# Patient Record
Sex: MALE | Race: WHITE | NOT HISPANIC OR LATINO | Employment: OTHER | URBAN - NONMETROPOLITAN AREA
[De-identification: names, ages, dates, MRNs, and addresses within clinical notes are randomized per-mention and may not be internally consistent; named-entity substitution may affect disease eponyms.]

---

## 2018-07-25 ENCOUNTER — OFFICE VISIT (OUTPATIENT)
Dept: UROLOGY | Facility: CLINIC | Age: 68
End: 2018-07-25

## 2018-07-25 VITALS
SYSTOLIC BLOOD PRESSURE: 120 MMHG | BODY MASS INDEX: 26.18 KG/M2 | WEIGHT: 215 LBS | TEMPERATURE: 98.1 F | HEART RATE: 88 BPM | OXYGEN SATURATION: 92 % | DIASTOLIC BLOOD PRESSURE: 70 MMHG | HEIGHT: 76 IN

## 2018-07-25 DIAGNOSIS — N40.2 PROSTATE NODULE: Primary | ICD-10-CM

## 2018-07-25 PROCEDURE — 99203 OFFICE O/P NEW LOW 30 MIN: CPT | Performed by: UROLOGY

## 2018-07-25 NOTE — PROGRESS NOTES
Chief Complaint  Prostate nodule        KAREY Ordaz is a 68 y.o. male who is referred for evaluation of his prostate after his primary care provider noted an abnormality on his digital rectal exam.  His been seen before and PSA has always been normal over the past 4 years.  He denies any difficulty voiding describing an AUA obstructive index of 0.  He does have a history of kidney stones but no family history of prostate cancer.  He has recently had wide excision of a melanoma on his left shoulder.    Vitals:    07/25/18 1331   BP: 120/70   Pulse: 88   Temp: 98.1 °F (36.7 °C)   SpO2: 92%       Past Medical History  Past Medical History:   Diagnosis Date   • Hypertension    • Kidney stones    • Prostate nodule        Past Surgical History  Past Surgical History:   Procedure Laterality Date   • KNEE SURGERY     • TOTAL HIP ARTHROPLASTY         Medications  has a current medication list which includes the following prescription(s): lisinopril.      Allergies  No Known Allergies    Social History  Social History     Social History Narrative   • No narrative on file       Family History  He has no family history of bladder or kidney cancer  He has no family history of kidney stones      AUA Symptom Score:      Review of Systems  Review of Systems   Constitutional: Negative.    Genitourinary: Negative.    Skin: Positive for wound.   All other systems reviewed and are negative.      Physical Exam  Physical Exam   Constitutional: He is oriented to person, place, and time. He appears well-developed and well-nourished.   HENT:   Head: Normocephalic and atraumatic.   Neck: Normal range of motion.   Pulmonary/Chest: Effort normal. No respiratory distress.   Abdominal: Soft. He exhibits no distension and no mass. There is no tenderness. No hernia.   Genitourinary: Testes normal and penis normal.         Musculoskeletal: Normal range of motion.   Lymphadenopathy:     He has no cervical adenopathy.   Neurological: He is alert and  oriented to person, place, and time.   Skin: Skin is warm and dry.   Psychiatric: He has a normal mood and affect. His behavior is normal.   Vitals reviewed.      Labs Recent and today in the office:  Results for orders placed or performed during the hospital encounter of 08/31/15   CBC and Differential   Result Value Ref Range    WBC 11.0 (H) 4.8 - 10.8 THOUS    RBC 4.83 4.70 - 6.10 m/uL    Hemoglobin 15.2 14.0 - 18.0 g/dL    Hematocrit 44 42 - 52 %    MCV 90.9 80.0 - 94.0 fL    MCH 31.5 (H) 27.0 - 31.0 uug    MCHC 34.6 30.0 - 37.0 g/dL    RDW 12.1 11.5 - 14.5 %    Platelets 206 130 - 400 THOUS    Neutrophil Rel % 73.1 37.0 - 80.0 %    Lymphocyte Rel % 17.0 10.0 - 50.0 %    Monocyte Rel % 7.2 0.0 - 12.0 %    Eosinophil Rel % 1.7 0.0 - 7.0 %    Basophil Rel % 0.50 0.00 - 2.50 %    Immature Granulocyte Rel % 0.50 0.00 - 2.50 %    Neutrophils Absolute 8.03 (H) 2.00 - 6.90 THOUS    Lymphocytes Absolute 1.87 0.60 - 3.40 THOUS    Monocytes Absolute 0.79 0.00 - 0.90 THOUS    Eosinophils Absolute 0.19 0.00 - 0.70 THOUS    Basophils Absolute 0.05 0.00 - 0.20 THOUS    Abs Imm Gran 0.05 0.00 - 0.60 THOUS   Urinalysis w/Culture if Indicated   Result Value Ref Range    Color, UA Yellow     Appearance, UA Cloudy     Glucose, UA Negative NEGATIVE    Bilirubin, UA Negative NEGATIVE    Ketones, UA Trace NEGATIVE    Specific Gravity, UA 1.015 1.003 - 1.035    Blood, UA Large NEGATIVE    pH, UA 7.0 5.0 - 8.0    Protein, UA Negative NEGATIVE    Urobilinogen, UA 1.0 0.2    Nitrite, UA Negative NEGATIVE    Leukocytes, UA Negative NEGATIVE    WBC, UA 0-3 0 - 3    RBC, UA 50-99 0 - 3    Epithelial Cells, UA 0-3 0 - 3   Comprehensive metabolic panel   Result Value Ref Range    Sodium 141 137 - 145 mmol/L    Potassium 3.5 3.5 - 5.1 mmol/L    Chloride 105 98 - 107 mmol/L    CO2 23 (L) 26 - 30 mmol/L    Anion Gap 17 10 - 20 mmol/L    BUN 20 7 - 20 mg/dL    Creatinine 1.1 0.6 - 1.3 mg/dL    BUN/Creatinine Ratio 17.9 6.3 - 21.9    eGFR >60  mL/min    Glucose 111 (H) 74 - 98 mg/dL    Calcium 9.0 8.4 - 10.2 mg/dL    Total Bilirubin 0.4 0.2 - 1.3 mg/dL    AST (SGOT) 20 15 - 46 U/L    ALT (SGPT) 33 13 - 69 U/L    Total Protein 6.0 (L) 6.3 - 8.2 g/dL    Albumin 3.7 3.5 - 5.0 g/dL    A/G Ratio 1.5 1.0 - 2.0    Alkaline Phosphatase 86 38 - 126 U/L         Assessment & Plan  Prostate Nodule: Digital rectal exam reveals no suspicious changes in the prostate itself although there is some nodularity just outside the capsule.  Combined with a PSA of 0.9 obtained recently I feel his risk is minimal and no biopsy is recommended at this time.  I do recommend annual exams and a heart healthy diet.

## 2019-07-29 ENCOUNTER — OFFICE VISIT (OUTPATIENT)
Dept: UROLOGY | Facility: CLINIC | Age: 69
End: 2019-07-29

## 2019-07-29 VITALS
SYSTOLIC BLOOD PRESSURE: 122 MMHG | HEART RATE: 69 BPM | RESPIRATION RATE: 16 BRPM | TEMPERATURE: 98.1 F | OXYGEN SATURATION: 97 % | DIASTOLIC BLOOD PRESSURE: 68 MMHG

## 2019-07-29 DIAGNOSIS — N40.2 PROSTATE NODULE: Primary | ICD-10-CM

## 2019-07-29 LAB
BILIRUB BLD-MCNC: NEGATIVE MG/DL
CLARITY, POC: CLEAR
COLOR UR: YELLOW
GLUCOSE UR STRIP-MCNC: NEGATIVE MG/DL
KETONES UR QL: NEGATIVE
LEUKOCYTE EST, POC: NEGATIVE
NITRITE UR-MCNC: NEGATIVE MG/ML
PH UR: 8 [PH] (ref 5–8)
PROT UR STRIP-MCNC: NEGATIVE MG/DL
PSA SERPL-MCNC: 0.86 NG/ML (ref 0.06–4)
RBC # UR STRIP: NEGATIVE /UL
SP GR UR: 1.01 (ref 1–1.03)
UROBILINOGEN UR QL: NORMAL

## 2019-07-29 PROCEDURE — 81003 URINALYSIS AUTO W/O SCOPE: CPT | Performed by: UROLOGY

## 2019-07-29 PROCEDURE — 99213 OFFICE O/P EST LOW 20 MIN: CPT | Performed by: UROLOGY

## 2019-07-29 RX ORDER — AMLODIPINE BESYLATE 2.5 MG/1
2.5 TABLET ORAL DAILY
COMMUNITY

## 2019-07-29 RX ORDER — RIBOFLAVIN (VITAMIN B2) 100 MG
1 TABLET ORAL DAILY
COMMUNITY

## 2019-07-29 RX ORDER — LISINOPRIL 20 MG/1
20 TABLET ORAL DAILY
COMMUNITY
End: 2021-07-24

## 2019-07-29 RX ORDER — OMEGA-3 FATTY ACIDS CAP DELAYED RELEASE 1000 MG 1000 MG
CAPSULE DELAYED RELEASE ORAL
COMMUNITY

## 2019-07-29 RX ORDER — ATORVASTATIN CALCIUM 40 MG/1
TABLET, FILM COATED ORAL
COMMUNITY

## 2019-07-29 NOTE — PROGRESS NOTES
Chief Complaint  Prostate Nodule (Yearly follow up.)        KAREY Ordaz is a 69 y.o. male who returns today for an annual checkup with original concerns about a nodule in the prostate.  On closer exam it seemed to be in the rectal mucosa outside the prostate and his PSA has always been normal.  Likewise a family history is negative.  He continues to deny any voiding difficulty with an AUA index of 1 except for a little interrupted stream early in the morning.    Vitals:    07/29/19 1334   BP: 122/68   Pulse: 69   Resp: 16   Temp: 98.1 °F (36.7 °C)   SpO2: 97%       Past Medical History  Past Medical History:   Diagnosis Date   • Hypertension    • Kidney stones    • Prostate nodule        Past Surgical History  Past Surgical History:   Procedure Laterality Date   • KNEE SURGERY     • TOTAL HIP ARTHROPLASTY         Medications  has a current medication list which includes the following prescription(s): amlodipine, ascorbic acid, atorvastatin, lisinopril, magnesium, fish oil, and lisinopril.      Allergies  No Known Allergies    Social History  Social History     Social History Narrative   • Not on file       Family History  He has no family history of bladder or kidney cancer  He has no family history of kidney stones      AUA Symptom Score:      Review of Systems  Review of Systems   Constitutional: Negative.    Genitourinary: Negative.    All other systems reviewed and are negative.      Physical Exam  Physical Exam   Constitutional: He is oriented to person, place, and time. He appears well-developed and well-nourished.   HENT:   Head: Normocephalic and atraumatic.   Neck: Normal range of motion.   Pulmonary/Chest: Effort normal. No respiratory distress.   Abdominal: Soft. He exhibits no distension and no mass. There is no tenderness. No hernia.   Genitourinary: Rectum normal and prostate normal.         Musculoskeletal: Normal range of motion.   Lymphadenopathy:     He has no cervical adenopathy.   Neurological:  He is alert and oriented to person, place, and time.   Skin: Skin is warm and dry.   Psychiatric: He has a normal mood and affect. His behavior is normal.   Vitals reviewed.      Labs Recent and today in the office:  Results for orders placed or performed in visit on 07/29/19   POC Urinalysis Dipstick, Automated   Result Value Ref Range    Color Yellow Yellow, Straw, Dark Yellow, Alanna    Clarity, UA Clear Clear    Specific Gravity  1.015 1.005 - 1.030    pH, Urine 8.0 5.0 - 8.0    Leukocytes Negative Negative    Nitrite, UA Negative Negative    Protein, POC Negative Negative mg/dL    Glucose, UA Negative Negative, 1000 mg/dL (3+) mg/dL    Ketones, UA Negative Negative    Urobilinogen, UA Normal Normal    Bilirubin Negative Negative    Blood, UA Negative Negative         Assessment & Plan  Prostate nodule: The palpable area of induration seems to be in the rectal mucosa with a more benign feeling prostate.  He has no urinary symptoms so PSA is obtained and if normal he can return on an annual basis.  He is encouraged to eat a heart healthy diet to reduce the risk of getting prostate cancer.

## 2020-02-20 ENCOUNTER — TRANSCRIBE ORDERS (OUTPATIENT)
Dept: ULTRASOUND IMAGING | Facility: HOSPITAL | Age: 70
End: 2020-02-20

## 2020-02-20 DIAGNOSIS — Z72.0 TOBACCO USE: Primary | ICD-10-CM

## 2020-02-27 ENCOUNTER — HOSPITAL ENCOUNTER (OUTPATIENT)
Dept: ULTRASOUND IMAGING | Facility: HOSPITAL | Age: 70
Discharge: HOME | End: 2020-02-27
Admitting: INTERNAL MEDICINE

## 2020-02-27 DIAGNOSIS — Z72.0 TOBACCO USE: ICD-10-CM

## 2020-02-27 PROCEDURE — 76775 US EXAM ABDO BACK WALL LIM: CPT

## 2020-07-29 ENCOUNTER — OFFICE VISIT (OUTPATIENT)
Dept: UROLOGY | Facility: CLINIC | Age: 70
End: 2020-07-29

## 2020-07-29 VITALS
SYSTOLIC BLOOD PRESSURE: 132 MMHG | TEMPERATURE: 97.8 F | RESPIRATION RATE: 16 BRPM | HEART RATE: 63 BPM | OXYGEN SATURATION: 98 % | DIASTOLIC BLOOD PRESSURE: 76 MMHG

## 2020-07-29 DIAGNOSIS — N40.2 PROSTATE NODULE: Primary | ICD-10-CM

## 2020-07-29 LAB
BILIRUB BLD-MCNC: NEGATIVE MG/DL
CLARITY, POC: CLEAR
COLOR UR: YELLOW
GLUCOSE UR STRIP-MCNC: NEGATIVE MG/DL
KETONES UR QL: NEGATIVE
LEUKOCYTE EST, POC: NEGATIVE
NITRITE UR-MCNC: NEGATIVE MG/ML
PH UR: 6 [PH] (ref 5–8)
PROT UR STRIP-MCNC: NEGATIVE MG/DL
RBC # UR STRIP: NEGATIVE /UL
SP GR UR: 1.03 (ref 1–1.03)
UROBILINOGEN UR QL: NORMAL

## 2020-07-29 PROCEDURE — 99213 OFFICE O/P EST LOW 20 MIN: CPT | Performed by: UROLOGY

## 2020-07-29 PROCEDURE — 81003 URINALYSIS AUTO W/O SCOPE: CPT | Performed by: UROLOGY

## 2020-07-29 NOTE — PROGRESS NOTES
Chief Complaint  Prostate Nodule (Yearly follow up.)        KAREY Ordaz is a 70 y.o. male who returns today for an annual checkup originally referred for prostate nodule.  He does have a discrete area just to the lateral aspect of his prostate that is a nodule but seems to be outside the gland.  Always had a normal PSA and his family history is negative for cancer.  He continues to deny any difficulty voiding describing an AUA index of 0.  I reviewed the recommendation of a plant-based heart healthy diet to reduce risk of getting prostate cancer and he said he already generally follows the recommendations of the Mediterranean diet.    Vitals:    07/29/20 1308   BP: 132/76   Pulse: 63   Resp: 16   Temp: 97.8 °F (36.6 °C)   SpO2: 98%       Past Medical History  Past Medical History:   Diagnosis Date   • Hypertension    • Kidney stones    • Prostate nodule        Past Surgical History  Past Surgical History:   Procedure Laterality Date   • KNEE SURGERY     • TOTAL HIP ARTHROPLASTY         Medications  has a current medication list which includes the following prescription(s): amlodipine, ascorbic acid, atorvastatin, lisinopril, lisinopril, magnesium, and fish oil.      Allergies  No Known Allergies    Social History  Social History     Socioeconomic History   • Marital status:      Spouse name: Not on file   • Number of children: Not on file   • Years of education: Not on file   • Highest education level: Not on file   Tobacco Use   • Smoking status: Never Smoker   • Smokeless tobacco: Never Used   Substance and Sexual Activity   • Alcohol use: No   • Drug use: Defer   • Sexual activity: Defer       Family History  He has no family history of bladder or kidney cancer  He has no family history of kidney stones      AUA Symptom Score:      Review of Systems  Review of Systems   Constitutional: Negative for activity change, appetite change, chills, fatigue, fever, unexpected weight gain and unexpected weight loss.    Respiratory: Negative for apnea, cough, chest tightness, shortness of breath, wheezing and stridor.    Cardiovascular: Negative for chest pain, palpitations and leg swelling.   Gastrointestinal: Negative for abdominal distention, abdominal pain, anal bleeding, blood in stool, constipation, diarrhea, nausea, rectal pain, vomiting, GERD and indigestion.   Genitourinary: Negative for decreased libido, decreased urine volume, difficulty urinating, discharge, dysuria, flank pain, frequency, genital sores, hematuria, nocturia, penile pain, erectile dysfunction, penile swelling, scrotal swelling, testicular pain, urgency and urinary incontinence.   Musculoskeletal: Negative for back pain and joint swelling.   Neurological: Negative for tremors, seizures, speech difficulty, weakness and numbness.   Psychiatric/Behavioral: Negative for agitation, decreased concentration, sleep disturbance, depressed mood and stress. The patient is not nervous/anxious.        Physical Exam  Physical Exam   Constitutional: He is oriented to person, place, and time. He appears well-developed and well-nourished.   HENT:   Head: Normocephalic and atraumatic.   Neck: Normal range of motion.   Pulmonary/Chest: Effort normal. No respiratory distress.   Abdominal: Soft. He exhibits no distension and no mass. There is no tenderness. No hernia.   Genitourinary:         Musculoskeletal: Normal range of motion.   Lymphadenopathy:     He has no cervical adenopathy.   Neurological: He is alert and oriented to person, place, and time.   Skin: Skin is warm and dry.   Psychiatric: He has a normal mood and affect. His behavior is normal.   Vitals reviewed.      Labs Recent and today in the office:  Results for orders placed or performed in visit on 07/29/20   POC Urinalysis Dipstick, Automated   Result Value Ref Range    Color Yellow Yellow, Straw, Dark Yellow, Alanna    Clarity, UA Clear Clear    Specific Gravity  1.030 1.005 - 1.030    pH, Urine 6.0 5.0 -  8.0    Leukocytes Negative Negative    Nitrite, UA Negative Negative    Protein, POC Negative Negative mg/dL    Glucose, UA Negative Negative, 1000 mg/dL (3+) mg/dL    Ketones, UA Negative Negative    Urobilinogen, UA Normal Normal    Bilirubin Negative Negative    Blood, UA Negative Negative         Assessment & Plan  Prostate nodule: Patient's palpable abnormality seems to be just outside the lateral margin of the gland and associated with an otherwise normal prostate.  PSA is obtained and as long as it is normal I recommend a heart healthy diet and return on an annual basis.

## 2020-07-30 LAB — PSA SERPL-MCNC: 0.86 NG/ML (ref 0–4)

## 2020-11-10 ENCOUNTER — ANESTHESIA EVENT (OUTPATIENT)
Dept: PERIOP | Facility: HOSPITAL | Age: 70
End: 2020-11-10

## 2020-11-10 ENCOUNTER — HOSPITAL ENCOUNTER (INPATIENT)
Facility: HOSPITAL | Age: 70
LOS: 6 days | Discharge: HOME | End: 2020-11-16
Attending: EMERGENCY MEDICINE | Admitting: SURGERY

## 2020-11-10 ENCOUNTER — ANESTHESIA (OUTPATIENT)
Dept: PERIOP | Facility: HOSPITAL | Age: 70
End: 2020-11-10

## 2020-11-10 DIAGNOSIS — K35.80 ACUTE APPENDICITIS: ICD-10-CM

## 2020-11-10 DIAGNOSIS — K35.30 ACUTE APPENDICITIS WITH LOCALIZED PERITONITIS, WITHOUT PERFORATION, ABSCESS, OR GANGRENE: Primary | ICD-10-CM

## 2020-11-10 LAB
ALBUMIN SERPL-MCNC: 4.5 G/DL (ref 3.5–5.2)
ALBUMIN/GLOB SERPL: 1.9 G/DL
ALP SERPL-CCNC: 91 U/L (ref 39–117)
ALT SERPL W P-5'-P-CCNC: 10 U/L (ref 1–41)
ANION GAP SERPL CALCULATED.3IONS-SCNC: 9 MMOL/L (ref 5–15)
AST SERPL-CCNC: 13 U/L (ref 1–40)
BASOPHILS # BLD AUTO: 0.02 10*3/MM3 (ref 0–0.2)
BASOPHILS NFR BLD AUTO: 0.2 % (ref 0–1.5)
BILIRUB SERPL-MCNC: 0.5 MG/DL (ref 0–1.2)
BUN SERPL-MCNC: 10 MG/DL (ref 8–23)
BUN/CREAT SERPL: 11.5 (ref 7–25)
CALCIUM SPEC-SCNC: 9.1 MG/DL (ref 8.6–10.5)
CHLORIDE SERPL-SCNC: 103 MMOL/L (ref 98–107)
CO2 SERPL-SCNC: 28 MMOL/L (ref 22–29)
CREAT SERPL-MCNC: 0.87 MG/DL (ref 0.76–1.27)
DEPRECATED RDW RBC AUTO: 39.8 FL (ref 37–54)
EOSINOPHIL # BLD AUTO: 0.1 10*3/MM3 (ref 0–0.4)
EOSINOPHIL NFR BLD AUTO: 1.1 % (ref 0.3–6.2)
ERYTHROCYTE [DISTWIDTH] IN BLOOD BY AUTOMATED COUNT: 11.9 % (ref 12.3–15.4)
GFR SERPL CREATININE-BSD FRML MDRD: 87 ML/MIN/1.73
GLOBULIN UR ELPH-MCNC: 2.4 GM/DL
GLUCOSE SERPL-MCNC: 111 MG/DL (ref 65–99)
HCT VFR BLD AUTO: 46.5 % (ref 37.5–51)
HGB BLD-MCNC: 15.6 G/DL (ref 13–17.7)
IMM GRANULOCYTES # BLD AUTO: 0.03 10*3/MM3 (ref 0–0.05)
IMM GRANULOCYTES NFR BLD AUTO: 0.3 % (ref 0–0.5)
LYMPHOCYTES # BLD AUTO: 1.36 10*3/MM3 (ref 0.7–3.1)
LYMPHOCYTES NFR BLD AUTO: 14.5 % (ref 19.6–45.3)
MCH RBC QN AUTO: 30.7 PG (ref 26.6–33)
MCHC RBC AUTO-ENTMCNC: 33.5 G/DL (ref 31.5–35.7)
MCV RBC AUTO: 91.5 FL (ref 79–97)
MONOCYTES # BLD AUTO: 0.85 10*3/MM3 (ref 0.1–0.9)
MONOCYTES NFR BLD AUTO: 9 % (ref 5–12)
NEUTROPHILS NFR BLD AUTO: 7.05 10*3/MM3 (ref 1.7–7)
NEUTROPHILS NFR BLD AUTO: 74.9 % (ref 42.7–76)
NRBC BLD AUTO-RTO: 0 /100 WBC (ref 0–0.2)
PLATELET # BLD AUTO: 201 10*3/MM3 (ref 140–450)
PMV BLD AUTO: 10.1 FL (ref 6–12)
POTASSIUM SERPL-SCNC: 4 MMOL/L (ref 3.5–5.2)
PROT SERPL-MCNC: 6.9 G/DL (ref 6–8.5)
RBC # BLD AUTO: 5.08 10*6/MM3 (ref 4.14–5.8)
SARS-COV-2 RDRP RESP QL NAA+PROBE: NOT DETECTED
SODIUM SERPL-SCNC: 140 MMOL/L (ref 136–145)
WBC # BLD AUTO: 9.41 10*3/MM3 (ref 3.4–10.8)

## 2020-11-10 PROCEDURE — 25010000002 SUCCINYLCHOLINE PER 20 MG: Performed by: ANESTHESIOLOGY

## 2020-11-10 PROCEDURE — 25010000002 PIPERACILLIN SOD-TAZOBACTAM PER 1 G: Performed by: EMERGENCY MEDICINE

## 2020-11-10 PROCEDURE — 87635 SARS-COV-2 COVID-19 AMP PRB: CPT | Performed by: EMERGENCY MEDICINE

## 2020-11-10 PROCEDURE — 0DTJ4ZZ RESECTION OF APPENDIX, PERCUTANEOUS ENDOSCOPIC APPROACH: ICD-10-PCS | Performed by: SURGERY

## 2020-11-10 PROCEDURE — 25010000002 ONDANSETRON PER 1 MG: Performed by: ANESTHESIOLOGY

## 2020-11-10 PROCEDURE — 85025 COMPLETE CBC W/AUTO DIFF WBC: CPT | Performed by: EMERGENCY MEDICINE

## 2020-11-10 PROCEDURE — 80053 COMPREHEN METABOLIC PANEL: CPT | Performed by: EMERGENCY MEDICINE

## 2020-11-10 PROCEDURE — 25010000002 HYDROMORPHONE PER 4 MG: Performed by: ANESTHESIOLOGY

## 2020-11-10 PROCEDURE — 88304 TISSUE EXAM BY PATHOLOGIST: CPT | Performed by: SURGERY

## 2020-11-10 PROCEDURE — 25010000002 KETOROLAC TROMETHAMINE PER 15 MG: Performed by: SURGERY

## 2020-11-10 PROCEDURE — 93005 ELECTROCARDIOGRAM TRACING: CPT | Performed by: EMERGENCY MEDICINE

## 2020-11-10 PROCEDURE — 25010000002 DEXAMETHASONE PER 1 MG: Performed by: ANESTHESIOLOGY

## 2020-11-10 PROCEDURE — 25010000002 FENTANYL CITRATE (PF) 100 MCG/2ML SOLUTION: Performed by: ANESTHESIOLOGY

## 2020-11-10 PROCEDURE — 99283 EMERGENCY DEPT VISIT LOW MDM: CPT

## 2020-11-10 PROCEDURE — G0378 HOSPITAL OBSERVATION PER HR: HCPCS

## 2020-11-10 PROCEDURE — 25010000003 LIDOCAINE 1 % SOLUTION: Performed by: ANESTHESIOLOGY

## 2020-11-10 PROCEDURE — 25010000002 PROPOFOL 10 MG/ML EMULSION: Performed by: ANESTHESIOLOGY

## 2020-11-10 DEVICE — ENDOPATH ECHELON ENDOSCOPIC LINEAR CUTTER RELOADS, BLUE, 60MM
Type: IMPLANTABLE DEVICE | Site: ABDOMEN | Status: FUNCTIONAL
Brand: ECHELON ENDOPATH

## 2020-11-10 DEVICE — ENDOPATH ECHELON ENDOSCOPIC LINEAR CUTTER RELOADS, WHITE, 60MM
Type: IMPLANTABLE DEVICE | Site: ABDOMEN | Status: FUNCTIONAL
Brand: ECHELON ENDOPATH

## 2020-11-10 RX ORDER — SODIUM CHLORIDE 9 MG/ML
INJECTION, SOLUTION INTRAVENOUS AS NEEDED
Status: DISCONTINUED | OUTPATIENT
Start: 2020-11-10 | End: 2020-11-10 | Stop reason: HOSPADM

## 2020-11-10 RX ORDER — ONDANSETRON 2 MG/ML
INJECTION INTRAMUSCULAR; INTRAVENOUS AS NEEDED
Status: DISCONTINUED | OUTPATIENT
Start: 2020-11-10 | End: 2020-11-10 | Stop reason: SURG

## 2020-11-10 RX ORDER — SODIUM CHLORIDE 0.9 % (FLUSH) 0.9 %
10 SYRINGE (ML) INJECTION AS NEEDED
Status: DISCONTINUED | OUTPATIENT
Start: 2020-11-10 | End: 2020-11-16 | Stop reason: HOSPADM

## 2020-11-10 RX ORDER — OXYCODONE HYDROCHLORIDE AND ACETAMINOPHEN 5; 325 MG/1; MG/1
1 TABLET ORAL EVERY 4 HOURS PRN
Status: DISCONTINUED | OUTPATIENT
Start: 2020-11-10 | End: 2020-11-11

## 2020-11-10 RX ORDER — NALOXONE HCL 0.4 MG/ML
0.4 VIAL (ML) INJECTION
Status: DISCONTINUED | OUTPATIENT
Start: 2020-11-10 | End: 2020-11-16 | Stop reason: HOSPADM

## 2020-11-10 RX ORDER — BUPIVACAINE HYDROCHLORIDE AND EPINEPHRINE 2.5; 5 MG/ML; UG/ML
INJECTION, SOLUTION EPIDURAL; INFILTRATION; INTRACAUDAL; PERINEURAL AS NEEDED
Status: DISCONTINUED | OUTPATIENT
Start: 2020-11-10 | End: 2020-11-10 | Stop reason: HOSPADM

## 2020-11-10 RX ORDER — PROMETHAZINE HYDROCHLORIDE 12.5 MG/1
12.5 SUPPOSITORY RECTAL EVERY 6 HOURS PRN
Status: DISCONTINUED | OUTPATIENT
Start: 2020-11-10 | End: 2020-11-16 | Stop reason: HOSPADM

## 2020-11-10 RX ORDER — FAMOTIDINE 20 MG/1
40 TABLET, FILM COATED ORAL DAILY
Status: DISCONTINUED | OUTPATIENT
Start: 2020-11-10 | End: 2020-11-16 | Stop reason: HOSPADM

## 2020-11-10 RX ORDER — PROPOFOL 10 MG/ML
VIAL (ML) INTRAVENOUS AS NEEDED
Status: DISCONTINUED | OUTPATIENT
Start: 2020-11-10 | End: 2020-11-10 | Stop reason: SURG

## 2020-11-10 RX ORDER — DOCUSATE SODIUM 100 MG/1
100 CAPSULE, LIQUID FILLED ORAL 2 TIMES DAILY
Status: DISCONTINUED | OUTPATIENT
Start: 2020-11-10 | End: 2020-11-15

## 2020-11-10 RX ORDER — ROCURONIUM BROMIDE 10 MG/ML
INJECTION, SOLUTION INTRAVENOUS AS NEEDED
Status: DISCONTINUED | OUTPATIENT
Start: 2020-11-10 | End: 2020-11-10 | Stop reason: SURG

## 2020-11-10 RX ORDER — DEXAMETHASONE SODIUM PHOSPHATE 4 MG/ML
INJECTION, SOLUTION INTRA-ARTICULAR; INTRALESIONAL; INTRAMUSCULAR; INTRAVENOUS; SOFT TISSUE AS NEEDED
Status: DISCONTINUED | OUTPATIENT
Start: 2020-11-10 | End: 2020-11-10 | Stop reason: SURG

## 2020-11-10 RX ORDER — SODIUM CHLORIDE, SODIUM LACTATE, POTASSIUM CHLORIDE, CALCIUM CHLORIDE 600; 310; 30; 20 MG/100ML; MG/100ML; MG/100ML; MG/100ML
INJECTION, SOLUTION INTRAVENOUS CONTINUOUS PRN
Status: DISCONTINUED | OUTPATIENT
Start: 2020-11-10 | End: 2020-11-10 | Stop reason: SURG

## 2020-11-10 RX ORDER — SODIUM CHLORIDE 0.9 % (FLUSH) 0.9 %
10 SYRINGE (ML) INJECTION EVERY 12 HOURS SCHEDULED
Status: DISCONTINUED | OUTPATIENT
Start: 2020-11-10 | End: 2020-11-10

## 2020-11-10 RX ORDER — SODIUM CHLORIDE, SODIUM LACTATE, POTASSIUM CHLORIDE, CALCIUM CHLORIDE 600; 310; 30; 20 MG/100ML; MG/100ML; MG/100ML; MG/100ML
50 INJECTION, SOLUTION INTRAVENOUS CONTINUOUS
Status: DISCONTINUED | OUTPATIENT
Start: 2020-11-10 | End: 2020-11-14

## 2020-11-10 RX ORDER — KETOROLAC TROMETHAMINE 30 MG/ML
15 INJECTION, SOLUTION INTRAMUSCULAR; INTRAVENOUS EVERY 6 HOURS PRN
Status: DISCONTINUED | OUTPATIENT
Start: 2020-11-10 | End: 2020-11-12

## 2020-11-10 RX ORDER — FENTANYL CITRATE 50 UG/ML
INJECTION, SOLUTION INTRAMUSCULAR; INTRAVENOUS AS NEEDED
Status: DISCONTINUED | OUTPATIENT
Start: 2020-11-10 | End: 2020-11-10 | Stop reason: SURG

## 2020-11-10 RX ORDER — HEPARIN SODIUM 5000 [USP'U]/ML
5000 INJECTION, SOLUTION INTRAVENOUS; SUBCUTANEOUS EVERY 8 HOURS SCHEDULED
Status: DISCONTINUED | OUTPATIENT
Start: 2020-11-10 | End: 2020-11-16 | Stop reason: HOSPADM

## 2020-11-10 RX ORDER — HYDROMORPHONE HYDROCHLORIDE 1 MG/ML
0.5 INJECTION, SOLUTION INTRAMUSCULAR; INTRAVENOUS; SUBCUTANEOUS
Status: DISCONTINUED | OUTPATIENT
Start: 2020-11-10 | End: 2020-11-10 | Stop reason: HOSPADM

## 2020-11-10 RX ORDER — SUCCINYLCHOLINE CHLORIDE 20 MG/ML
INJECTION INTRAMUSCULAR; INTRAVENOUS AS NEEDED
Status: DISCONTINUED | OUTPATIENT
Start: 2020-11-10 | End: 2020-11-10 | Stop reason: SURG

## 2020-11-10 RX ORDER — FENTANYL CITRATE 50 UG/ML
50 INJECTION, SOLUTION INTRAMUSCULAR; INTRAVENOUS
Status: DISCONTINUED | OUTPATIENT
Start: 2020-11-10 | End: 2020-11-10 | Stop reason: HOSPADM

## 2020-11-10 RX ORDER — FAMOTIDINE 20 MG/1
20 TABLET, FILM COATED ORAL ONCE
Status: COMPLETED | OUTPATIENT
Start: 2020-11-10 | End: 2020-11-10

## 2020-11-10 RX ORDER — PROMETHAZINE HYDROCHLORIDE 12.5 MG/1
12.5 TABLET ORAL EVERY 6 HOURS PRN
Status: DISCONTINUED | OUTPATIENT
Start: 2020-11-10 | End: 2020-11-16 | Stop reason: HOSPADM

## 2020-11-10 RX ORDER — SODIUM CHLORIDE 0.9 % (FLUSH) 0.9 %
10 SYRINGE (ML) INJECTION AS NEEDED
Status: DISCONTINUED | OUTPATIENT
Start: 2020-11-10 | End: 2020-11-10

## 2020-11-10 RX ORDER — HYDROMORPHONE HYDROCHLORIDE 1 MG/ML
0.2 INJECTION, SOLUTION INTRAMUSCULAR; INTRAVENOUS; SUBCUTANEOUS
Status: DISCONTINUED | OUTPATIENT
Start: 2020-11-10 | End: 2020-11-16 | Stop reason: HOSPADM

## 2020-11-10 RX ORDER — LIDOCAINE HYDROCHLORIDE 10 MG/ML
INJECTION, SOLUTION INFILTRATION; PERINEURAL AS NEEDED
Status: DISCONTINUED | OUTPATIENT
Start: 2020-11-10 | End: 2020-11-10 | Stop reason: SURG

## 2020-11-10 RX ADMIN — LIDOCAINE HYDROCHLORIDE 50 MG: 10 INJECTION, SOLUTION INFILTRATION; PERINEURAL at 19:48

## 2020-11-10 RX ADMIN — SODIUM CHLORIDE, POTASSIUM CHLORIDE, SODIUM LACTATE AND CALCIUM CHLORIDE 100 ML/HR: 600; 310; 30; 20 INJECTION, SOLUTION INTRAVENOUS at 23:46

## 2020-11-10 RX ADMIN — SODIUM CHLORIDE, POTASSIUM CHLORIDE, SODIUM LACTATE AND CALCIUM CHLORIDE: 600; 310; 30; 20 INJECTION, SOLUTION INTRAVENOUS at 19:44

## 2020-11-10 RX ADMIN — HYDROMORPHONE HYDROCHLORIDE 0.5 MG: 1 INJECTION, SOLUTION INTRAMUSCULAR; INTRAVENOUS; SUBCUTANEOUS at 22:40

## 2020-11-10 RX ADMIN — FENTANYL CITRATE 50 MCG: 0.05 INJECTION, SOLUTION INTRAMUSCULAR; INTRAVENOUS at 22:45

## 2020-11-10 RX ADMIN — ROCURONIUM BROMIDE 10 MG: 10 INJECTION INTRAVENOUS at 20:15

## 2020-11-10 RX ADMIN — FAMOTIDINE 20 MG: 20 TABLET, FILM COATED ORAL at 18:45

## 2020-11-10 RX ADMIN — FENTANYL CITRATE 50 MCG: 0.05 INJECTION, SOLUTION INTRAMUSCULAR; INTRAVENOUS at 22:20

## 2020-11-10 RX ADMIN — FENTANYL CITRATE 50 MCG: 0.05 INJECTION, SOLUTION INTRAMUSCULAR; INTRAVENOUS at 22:51

## 2020-11-10 RX ADMIN — SUCCINYLCHOLINE CHLORIDE 100 MG: 20 INJECTION, SOLUTION INTRAMUSCULAR; INTRAVENOUS; PARENTERAL at 19:48

## 2020-11-10 RX ADMIN — HYDROMORPHONE HYDROCHLORIDE 0.5 MG: 1 INJECTION, SOLUTION INTRAMUSCULAR; INTRAVENOUS; SUBCUTANEOUS at 22:29

## 2020-11-10 RX ADMIN — TAZOBACTAM SODIUM AND PIPERACILLIN SODIUM 3.38 G: 375; 3 INJECTION, SOLUTION INTRAVENOUS at 17:41

## 2020-11-10 RX ADMIN — KETOROLAC TROMETHAMINE 15 MG: 30 INJECTION, SOLUTION INTRAMUSCULAR; INTRAVENOUS at 23:47

## 2020-11-10 RX ADMIN — ONDANSETRON 4 MG: 2 INJECTION INTRAMUSCULAR; INTRAVENOUS at 22:04

## 2020-11-10 RX ADMIN — DEXAMETHASONE SODIUM PHOSPHATE 8 MG: 4 INJECTION, SOLUTION INTRAMUSCULAR; INTRAVENOUS at 19:55

## 2020-11-10 RX ADMIN — PROPOFOL 150 MG: 10 INJECTION, EMULSION INTRAVENOUS at 19:48

## 2020-11-10 RX ADMIN — HYDROMORPHONE HYDROCHLORIDE 0.5 MG: 1 INJECTION, SOLUTION INTRAMUSCULAR; INTRAVENOUS; SUBCUTANEOUS at 22:36

## 2020-11-10 RX ADMIN — FENTANYL CITRATE 100 MCG: 50 INJECTION, SOLUTION INTRAMUSCULAR; INTRAVENOUS at 19:48

## 2020-11-10 RX ADMIN — FENTANYL CITRATE 50 MCG: 0.05 INJECTION, SOLUTION INTRAMUSCULAR; INTRAVENOUS at 22:28

## 2020-11-10 RX ADMIN — ROCURONIUM BROMIDE 25 MG: 10 INJECTION INTRAVENOUS at 19:55

## 2020-11-10 RX ADMIN — ROCURONIUM BROMIDE 5 MG: 10 INJECTION INTRAVENOUS at 19:48

## 2020-11-10 NOTE — ED PROVIDER NOTES
Subjective   Mr Orlin presents with right lower quadrant abdominal pain.  He reports that it started out 3 days ago as a generalized pain and is gradually moved to his right lower quadrant.  His appetite remains intact and he has continued to eat and drink and move his bowels normally.  Pain is worse with movement.  He denies nausea or vomiting.  He denies any recent upper respiratory symptoms.  I spoke with his primary care provider earlier today.  She had obtained an outpatient CT scan which showed appendicitis.      History provided by:  Patient  Abdominal Pain  Pain location:  RLQ  Pain quality: dull    Pain radiates to:  Does not radiate  Pain severity:  Moderate  Onset quality:  Gradual  Timing:  Constant  Progression:  Worsening  Chronicity:  New  Relieved by:  Nothing  Worsened by:  Movement  Associated symptoms: no anorexia, no chest pain, no chills, no cough, no dysuria, no fever, no nausea, no shortness of breath and no vomiting        Review of Systems   Constitutional: Negative for chills and fever.   HENT: Negative for congestion and rhinorrhea.    Respiratory: Negative for cough and shortness of breath.    Cardiovascular: Negative for chest pain.   Gastrointestinal: Positive for abdominal pain. Negative for anorexia, nausea and vomiting.   Genitourinary: Negative for dysuria.   All other systems reviewed and are negative.      Past Medical History:   Diagnosis Date   • Hypertension    • Kidney stones    • Prostate nodule        No Known Allergies    Past Surgical History:   Procedure Laterality Date   • KNEE SURGERY Left     1971 meniscus repair,  arthroscopy 1985   • TONSILLECTOMY     • TOTAL HIP ARTHROPLASTY Left        Family History   Problem Relation Age of Onset   • Cancer Father    • Diabetes Father    • Cancer Mother        Social History     Socioeconomic History   • Marital status:      Spouse name: Not on file   • Number of children: Not on file   • Years of education: Not on file    • Highest education level: Not on file   Tobacco Use   • Smoking status: Never Smoker   • Smokeless tobacco: Never Used   Substance and Sexual Activity   • Alcohol use: Yes     Comment: occas   • Drug use: Defer   • Sexual activity: Defer           Objective   Physical Exam  Vitals signs and nursing note reviewed.   Constitutional:       General: He is not in acute distress.     Appearance: He is well-developed.   HENT:      Head: Normocephalic and atraumatic.   Eyes:      General: No scleral icterus.  Cardiovascular:      Rate and Rhythm: Normal rate and regular rhythm.      Heart sounds: No murmur. No friction rub.   Pulmonary:      Effort: Pulmonary effort is normal.      Breath sounds: Normal breath sounds. No wheezing or rales.   Abdominal:      General: Abdomen is flat. Bowel sounds are normal.      Palpations: Abdomen is soft.      Tenderness: There is abdominal tenderness in the right lower quadrant. There is guarding. There is no rebound.      Comments: He has only mild tenderness in the right lower quadrant but does have some guarding   Skin:     General: Skin is warm and dry.      Capillary Refill: Capillary refill takes less than 2 seconds.      Findings: No rash.   Neurological:      General: No focal deficit present.      Mental Status: He is alert and oriented to person, place, and time.      Motor: No weakness.   Psychiatric:         Mood and Affect: Mood normal. Mood is not anxious.         Behavior: Behavior normal.         Procedures           ED Course  ED Course as of Nov 10 2039   Tue Nov 10, 2020   1712 D/w Dr Zayas who will come see Mr Ordaz.  I have reviewed the CT report.  I have ordered basic labs as well as IV antibiotics and a rapid Covid swab.    [DT]   6805 Dr Zayas arrived and I introduced him to Mr. Ordaz.    [DT]      ED Course User Index  [DT] Per Barnett MD                                           MDM  Number of Diagnoses or Management Options  Acute appendicitis with  localized peritonitis, without perforation, abscess, or gangrene: new and requires workup     Amount and/or Complexity of Data Reviewed  Clinical lab tests: ordered and reviewed  Tests in the radiology section of CPT®: reviewed  Discuss the patient with other providers: yes  Independent visualization of images, tracings, or specimens: yes    Patient Progress  Patient progress: improved      Final diagnoses:   Acute appendicitis with localized peritonitis, without perforation, abscess, or gangrene            Per Barnett MD  11/10/20 6814

## 2020-11-10 NOTE — H&P
General Surgery History and Physical Note    Date of Service: 11/10/2020  Maximo Ordaz  [id number]  1950      Referring Provider: Per Barnett MD    Location of Consult: Emergency Department     Reason for Consultation: appendicitis     History of Present Illness:  I am seeing, Maximo Ordaz, in consultation at request of Per Barnett MD regarding abdominal pain and concern for appendicitis. He is a 70 year old gentleman with history of HTN who presents to [facility] Emergency Department with complaints of abdominal pain X 3 days. This initially started with some gas, bloating, and generalized abdominal pain which was fairly mild. The pain persisted however and yesterday this migrated to his RLQ. The pain is constant, but fairly mild. It is worsened with movement and riding in the care and improved with lying still. He has never had pain like this before. He has not had nausea, vomiting, fevers or chills. Last PO intake at 0800 today. Due to persistent pain he was seen by his PCP this morning who obtained CT scan as an outpatient that was concerning for appendicitis and asked him to come to the ER. No prior abdominal surgeries and does not take anticoagulants.     Problems Addressed this Visit     None      Visit Diagnoses     Acute appendicitis with localized peritonitis, without perforation, abscess, or gangrene    -  Primary      Diagnoses       Codes Comments    Acute appendicitis with localized peritonitis, without perforation, abscess, or gangrene    -  Primary ICD-10-CM: K35.30  ICD-9-CM: 540.9           PMHx:  Past Medical History:   Diagnosis Date   • Hypertension    • Kidney stones    • Prostate nodule        Past Surgical History:  Past Surgical History:   • KNEE SURGERY   • TOTAL HIP ARTHROPLASTY       Allergies:  No Known Allergies    Medications:  No current facility-administered medications on file prior to encounter.      Current Outpatient Medications on File Prior  to Encounter   Medication Sig Dispense Refill   • amLODIPine (NORVASC) 2.5 MG tablet amlodipine 2.5 mg tablet     • ascorbic acid (VITAMIN C) 100 MG tablet Take 1 tablet by mouth Daily.     • atorvastatin (LIPITOR) 40 MG tablet atorvastatin 40 mg tablet     • lisinopril (PRINIVIL,ZESTRIL) 20 MG tablet Take 20 mg by mouth daily.     • lisinopril (PRINIVIL,ZESTRIL) 20 MG tablet lisinopril 20 mg tablet     • Magnesium 100 MG capsule Take  by mouth.     • Omega-3 Fatty Acids (FISH OIL) 1000 MG capsule delayed-release Take  by mouth.           Current Facility-Administered Medications:   •  [COMPLETED] Insert peripheral IV, , , Once **AND** sodium chloride 0.9 % flush 10 mL, 10 mL, Intravenous, PRN, Per Barnett MD    Current Outpatient Medications:   •  amLODIPine (NORVASC) 2.5 MG tablet, amlodipine 2.5 mg tablet, Disp: , Rfl:   •  ascorbic acid (VITAMIN C) 100 MG tablet, Take 1 tablet by mouth Daily., Disp: , Rfl:   •  atorvastatin (LIPITOR) 40 MG tablet, atorvastatin 40 mg tablet, Disp: , Rfl:   •  lisinopril (PRINIVIL,ZESTRIL) 20 MG tablet, Take 20 mg by mouth daily., Disp: , Rfl:   •  lisinopril (PRINIVIL,ZESTRIL) 20 MG tablet, lisinopril 20 mg tablet, Disp: , Rfl:   •  Magnesium 100 MG capsule, Take  by mouth., Disp: , Rfl:   •  Omega-3 Fatty Acids (FISH OIL) 1000 MG capsule delayed-release, Take  by mouth., Disp: , Rfl:       Family History:  Family History   Problem Relation Age of Onset   • Cancer Father    • Diabetes Father    • Cancer Mother        Social History: Pt lives in [place], [place] .    Tobacco use: Denies   EtOH use : Denies   Illicit drug use: Denies       Review of Systems:   Constitutional: No fevers, chills or malaise   Eyes: Denies visual changes    Cardiovascular: Denies chest pain, palpitations   Pulmonary: Denies cough or shortness of breath   Abdominal/ GI: See HPI    Genitourinary: Denies dysuria or hematuria   Musculoskeletal: Denies any but chronic joint aches, pains or  "deformities   Psychiatric: No recent mood changes   Neurologic: No paresthesias or loss of function    /89 (BP Location: Left arm, Patient Position: Sitting)   Pulse 73   Temp 98.4 °F (36.9 °C)   Resp 18   Ht 193 cm (76\")   Wt 91.2 kg (201 lb)   SpO2 96%   BMI 24.47 kg/m²   Body mass index is 24.47 kg/m².    Gen: awake, alert, NAD, resting in chair   Head: Normocephalic, atraumatic.   Eyes: Pupils equal, round, react to light and accommodation.   Mouth: Oral mucosa without lesions,   Neck: No masses, lymphadenopathy or carotid bruits bilaterally   CV: Rhythm and rate regular, no murmurs, rubs or gallops  Lungs: Clear to auscultation bilaterally, not labored on room air   Abdomen: soft, not distended, no scars, focal tenderness to palpation in RLQ with focal peritonitis   Groin : No obvious hernias bilaterally   Extremities:  No cyanosis, clubbing or edema bilaterally  Lymphatics: No abnormal lymphadenopathy appreciated   Neurologic: No gross deficits         CBC  Results from last 7 days   Lab Units 11/10/20  1728   WBC 10*3/mm3 9.41   HEMOGLOBIN g/dL 15.6   HEMATOCRIT % 46.5   PLATELETS 10*3/mm3 201       CMP        Invalid input(s): LABALBU, PROT    Radiology  Imaging Results (Last 72 Hours)     ** No results found for the last 72 hours. **              Results Review: I have personally reviewed all of the recent lab and imaging results available at this time.     Assessment:  Mr. Marsh is a 70 year old gentleman with history of HTN with acute appendicitis. Vitals and labs without significant abnormality. He has significant focal tenderness to palpation in his RLQ on exam. I have personally reviewed his OSH CT images which demonstrate an enlarged appendix with periappendiceal stranding and a fecalith, although he has quite a bit of air in his appendix. We had a discussion regarding the disease process and I have recommended appendectomy to him and he wishes to proceed     Plan:  -Admit to " obs  -NPO  -IV Zosyn  -Preop COVID screen  -Plan to OR for laparoscopic appendectomy     I had a long discussion with the patient regarding the risks, benefits, and alternatives to the procedure including specifically discussing the risks of bleeding, infection, damage to adjacent structures, possible need for further procedures, risks of conversion to an open operation and possible need for ileocecectomy or right colectomy based on intraoperative findings. They wish to proceed.            Yvan Zayas MD  11/10/20  17:54 EST

## 2020-11-11 LAB
ANION GAP SERPL CALCULATED.3IONS-SCNC: 10 MMOL/L (ref 5–15)
BUN SERPL-MCNC: 14 MG/DL (ref 8–23)
BUN/CREAT SERPL: 14.9 (ref 7–25)
CALCIUM SPEC-SCNC: 8.9 MG/DL (ref 8.6–10.5)
CHLORIDE SERPL-SCNC: 102 MMOL/L (ref 98–107)
CO2 SERPL-SCNC: 25 MMOL/L (ref 22–29)
CREAT SERPL-MCNC: 0.94 MG/DL (ref 0.76–1.27)
DEPRECATED RDW RBC AUTO: 40.5 FL (ref 37–54)
ERYTHROCYTE [DISTWIDTH] IN BLOOD BY AUTOMATED COUNT: 11.8 % (ref 12.3–15.4)
GFR SERPL CREATININE-BSD FRML MDRD: 80 ML/MIN/1.73
GLUCOSE SERPL-MCNC: 173 MG/DL (ref 65–99)
HCT VFR BLD AUTO: 46.1 % (ref 37.5–51)
HGB BLD-MCNC: 15.5 G/DL (ref 13–17.7)
MCH RBC QN AUTO: 31.8 PG (ref 26.6–33)
MCHC RBC AUTO-ENTMCNC: 33.6 G/DL (ref 31.5–35.7)
MCV RBC AUTO: 94.5 FL (ref 79–97)
PLATELET # BLD AUTO: 191 10*3/MM3 (ref 140–450)
PMV BLD AUTO: 10.5 FL (ref 6–12)
POTASSIUM SERPL-SCNC: 4.6 MMOL/L (ref 3.5–5.2)
RBC # BLD AUTO: 4.88 10*6/MM3 (ref 4.14–5.8)
SODIUM SERPL-SCNC: 137 MMOL/L (ref 136–145)
WBC # BLD AUTO: 12.17 10*3/MM3 (ref 3.4–10.8)

## 2020-11-11 PROCEDURE — 25010000002 HEPARIN (PORCINE) PER 1000 UNITS: Performed by: SURGERY

## 2020-11-11 PROCEDURE — 25010000002 PIPERACILLIN SOD-TAZOBACTAM PER 1 G: Performed by: SURGERY

## 2020-11-11 PROCEDURE — 25010000002 KETOROLAC TROMETHAMINE PER 15 MG: Performed by: SURGERY

## 2020-11-11 PROCEDURE — 85027 COMPLETE CBC AUTOMATED: CPT | Performed by: SURGERY

## 2020-11-11 PROCEDURE — 25010000002 HYDROMORPHONE PER 4 MG: Performed by: SURGERY

## 2020-11-11 PROCEDURE — 80048 BASIC METABOLIC PNL TOTAL CA: CPT | Performed by: SURGERY

## 2020-11-11 RX ORDER — OXYCODONE HYDROCHLORIDE 5 MG/1
10 TABLET ORAL EVERY 4 HOURS PRN
Status: DISCONTINUED | OUTPATIENT
Start: 2020-11-11 | End: 2020-11-16 | Stop reason: HOSPADM

## 2020-11-11 RX ORDER — ACETAMINOPHEN 325 MG/1
650 TABLET ORAL EVERY 6 HOURS
Status: DISCONTINUED | OUTPATIENT
Start: 2020-11-11 | End: 2020-11-16 | Stop reason: HOSPADM

## 2020-11-11 RX ADMIN — DOCUSATE SODIUM 100 MG: 100 CAPSULE ORAL at 08:55

## 2020-11-11 RX ADMIN — TAZOBACTAM SODIUM AND PIPERACILLIN SODIUM 3.38 G: 375; 3 INJECTION, SOLUTION INTRAVENOUS at 05:30

## 2020-11-11 RX ADMIN — HEPARIN SODIUM 5000 UNITS: 5000 INJECTION INTRAVENOUS; SUBCUTANEOUS at 14:14

## 2020-11-11 RX ADMIN — SODIUM CHLORIDE, POTASSIUM CHLORIDE, SODIUM LACTATE AND CALCIUM CHLORIDE 100 ML/HR: 600; 310; 30; 20 INJECTION, SOLUTION INTRAVENOUS at 09:52

## 2020-11-11 RX ADMIN — HYDROMORPHONE HYDROCHLORIDE 0.2 MG: 1 INJECTION, SOLUTION INTRAMUSCULAR; INTRAVENOUS; SUBCUTANEOUS at 11:39

## 2020-11-11 RX ADMIN — TAZOBACTAM SODIUM AND PIPERACILLIN SODIUM 3.38 G: 375; 3 INJECTION, SOLUTION INTRAVENOUS at 00:18

## 2020-11-11 RX ADMIN — OXYCODONE HYDROCHLORIDE 10 MG: 5 TABLET ORAL at 18:31

## 2020-11-11 RX ADMIN — SODIUM CHLORIDE, POTASSIUM CHLORIDE, SODIUM LACTATE AND CALCIUM CHLORIDE 100 ML/HR: 600; 310; 30; 20 INJECTION, SOLUTION INTRAVENOUS at 20:35

## 2020-11-11 RX ADMIN — ACETAMINOPHEN 650 MG: 325 TABLET, FILM COATED ORAL at 23:31

## 2020-11-11 RX ADMIN — TAZOBACTAM SODIUM AND PIPERACILLIN SODIUM 3.38 G: 375; 3 INJECTION, SOLUTION INTRAVENOUS at 14:14

## 2020-11-11 RX ADMIN — TAZOBACTAM SODIUM AND PIPERACILLIN SODIUM 3.38 G: 375; 3 INJECTION, SOLUTION INTRAVENOUS at 21:34

## 2020-11-11 RX ADMIN — HEPARIN SODIUM 5000 UNITS: 5000 INJECTION INTRAVENOUS; SUBCUTANEOUS at 21:33

## 2020-11-11 RX ADMIN — OXYCODONE HYDROCHLORIDE AND ACETAMINOPHEN 1 TABLET: 5; 325 TABLET ORAL at 14:14

## 2020-11-11 RX ADMIN — KETOROLAC TROMETHAMINE 15 MG: 30 INJECTION, SOLUTION INTRAMUSCULAR; INTRAVENOUS at 11:39

## 2020-11-11 RX ADMIN — DOCUSATE SODIUM 100 MG: 100 CAPSULE ORAL at 21:33

## 2020-11-11 RX ADMIN — HEPARIN SODIUM 5000 UNITS: 5000 INJECTION INTRAVENOUS; SUBCUTANEOUS at 05:31

## 2020-11-11 RX ADMIN — OXYCODONE HYDROCHLORIDE AND ACETAMINOPHEN 1 TABLET: 5; 325 TABLET ORAL at 08:55

## 2020-11-11 RX ADMIN — FAMOTIDINE 40 MG: 20 TABLET, FILM COATED ORAL at 08:55

## 2020-11-11 NOTE — PLAN OF CARE
Problem: Adult Inpatient Plan of Care  Goal: Plan of Care Review  11/11/2020 0429 by Erickson Aldana, RN  Outcome: Ongoing, Progressing  Flowsheets  Taken 11/11/2020 0429 by Erickson Aldana, RN  Progress: improving  Outcome Summary: VSS. Resting well. PRN for pain. UOP adq. ABX infusing. Continue care.  Taken 11/10/2020 1941 by Lorie Cleaning, RN  Plan of Care Reviewed With: patient   Goal Outcome Evaluation:  Plan of Care Reviewed With: patient

## 2020-11-11 NOTE — BRIEF OP NOTE
APPENDECTOMY LAPAROSCOPIC  Progress Note    Maximo Ordaz  11/10/2020    Pre-op Diagnosis:   Acute appendicitis        Post-Op Diagnosis Codes:   perforated appendicitis     Procedure/CPT® Codes:        Procedure(s):  APPENDECTOMY LAPAROSCOPIC    Surgeon(s):  Yvan Zayas MD    Anesthesia: General    Staff:   Circulator: Crispin Torres RN; Kyra Benedict RN  Scrub Person: Eduardo Buckner; Marlin Nye  Nursing Assistant: Angelica Thomspon         Estimated Blood Loss: 10 mL    Urine Voided: * No values recorded between 11/10/2020  7:45 PM and 11/10/2020 10:04 PM *    Specimens:                Specimens     ID Source Type Tests Collected By Collected At Frozen?      A Large Intestine, Appendix Tissue · TISSUE PATHOLOGY EXAM   Yvan Zayas MD 11/10/20 2009 No     Description: APPENDIX    This specimen was not marked as sent.                Drains:   Closed/Suction Drain 1 Abdomen Bulb 10 Fr. (Active)       Findings: appendix perforated at base with fecal contamination to RLQ, appendiceal stump identified and transected with blue load staples, oversewn with 3-0 silk     Complications: none immediate           Yvan Zayas MD     Date: 11/10/2020  Time: 22:04 EST

## 2020-11-11 NOTE — ANESTHESIA POSTPROCEDURE EVALUATION
Patient: Maximo Ordaz    Procedure Summary     Date: 11/10/20 Room / Location:  [facility] / [facility]    Anesthesia Start: 1944 Anesthesia Stop: 2216    Procedure: APPENDECTOMY LAPAROSCOPIC (N/A Abdomen) Diagnosis:     Surgeon: Yvan Zayas MD Provider: Juan Sharma MD    Anesthesia Type: general ASA Status: 2 - Emergent          Anesthesia Type: general    Vitals  Vitals Value Taken Time   /68 11/10/20 2215   Temp 97.9 °F (36.6 °C) 11/10/20 2215   Pulse 78 11/10/20 2215   Resp     SpO2 96 % 11/10/20 2215           Post Anesthesia Care and Evaluation    Patient location during evaluation: PACU  Patient participation: complete - patient participated  Level of consciousness: awake and alert  Pain management: adequate  Airway patency: patent  Anesthetic complications: No anesthetic complications  PONV Status: none  Cardiovascular status: hemodynamically stable and acceptable  Respiratory status: nonlabored ventilation, acceptable and nasal cannula  Hydration status: acceptable

## 2020-11-11 NOTE — PLAN OF CARE
Goal Outcome Evaluation:  Plan of Care Reviewed With: patient  Progress: improving  Outcome Summary: VSS. Resting well. Encouraged ambulation. Patientattempted to get up to ambulate but only made to the side of the bed. IV Dilaudid prn breakthrough pain with percocet. Tolerated clears. IS teaching done.

## 2020-11-11 NOTE — PROGRESS NOTES
"Patient Name:  Maximo Ordaz  YOB: 1950  [id number]    Surgery Progress Note    Date of visit: 11/11/2020      Subjective: No acute events overnight.  Pain controlled.  Has not been out of bed yet.  Denies nausea or vomiting.  No flatus or bowel movement since surgery.  NANO with 140 mL output          Objective:     /50   Pulse 88   Temp 98.2 °F (36.8 °C) (Oral)   Resp 18   Ht 193 cm (76\")   Wt 88.8 kg (195 lb 11.2 oz)   SpO2 96%   BMI 23.82 kg/m²     Intake/Output Summary (Last 24 hours) at 11/11/2020 0708  Last data filed at 11/11/2020 0600  Gross per 24 hour   Intake 1100 ml   Output 540 ml   Net 560 ml       GEN:   Awake, alert, in no acute distress, resting comfortably in bed   CV:   Regular rate and rhythm  L:  Clear to auscultation bilaterally, not labored on room air   Abd:  Soft, appropriately tender along incisions, NANO in lower midline with serosanguineous output, not distended  Ext:  No cyanosis, clubbing, or edema    Recent labs that are back at this time have been reviewed.           Assessment/ Plan:      Mr. Orosco is a 70-year-old gentleman with history of hypertension with perforated appendicitis status post laparoscopic appendectomy on November 10      #Perforated appendicitis with right lower quadrant fecal contamination  -Postoperative day 1  -Continue IV Zosyn.  Plan for 4 days total antibiotics  -Clear liquids as tolerated  -Out of bed and ambulate today  -Monitor NANO output  -Await return of bowel function    #Hypertension  -Home meds as appropriate    FEN  -LR at 100 cc/h  -Clear liquid diet    PPX  -Subcu heparin    Yvan Zayas MD  11/11/2020  07:08 EST    "

## 2020-11-11 NOTE — PROGRESS NOTES
Discharge Planning Assessment  [facility]     Patient Name: Maximo Ordaz  MRN: [id number]  Today's Date: 11/11/2020    Admit Date: 11/10/2020    Discharge Needs Assessment     Row Name 11/11/20 1038       Living Environment    Lives With  alone    Unique Family Situation  Brother Jake valle contact: [phone]    Current Living Arrangements  home/apartment/condo    Primary Care Provided by  self    Provides Primary Care For  no one    Family Caregiver if Needed  sibling(s)    Family Caregiver Names  Brother Jake    Quality of Family Relationships  helpful;supportive    Able to Return to Prior Arrangements  yes    Living Arrangement Comments  Plan home at discharge       Resource/Environmental Concerns    Resource/Environmental Concerns  none    Transportation Concerns  car, none       Transition Planning    Patient/Family Anticipates Transition to  home    Patient/Family Anticipated Services at Transition  none    Transportation Anticipated  family or friend will provide       Discharge Needs Assessment    Equipment Currently Used at Home  none    Anticipated Changes Related to Illness  none    Equipment Needed After Discharge  none    Discharge Coordination/Progress  Plan is home at time of discharge.        Discharge Plan     Row Name 11/11/20 1040       Plan    Plan  Home    Patient/Family in Agreement with Plan  yes    Plan Comments  I talked with patient at bedside. Patient lives alone in [place]. Bed/Bath on first floor. Independent PTA. No DME. Plan is home and has transportation at [employer]. Celia @ [phone]    Final Discharge Disposition Code  01 - home or self-care        Continued Care and Services - Admitted Since 11/10/2020    Coordination has not been started for this encounter.         Demographic Summary     Row Name 11/11/20 1037       General Information    Referral Source  admission list    Reason for Consult  discharge planning    Preferred Language  English     Used During This  Interaction  no       Contact Information    Permission Granted to Share Info With      Contact Information Obtained for      Contact Information Comments  PCP: Eladia Loredo, confirmed by patient        Functional Status     Row Name 11/11/20 1038       Functional Status    Usual Activity Tolerance  good    Current Activity Tolerance  moderate       Functional Status, IADL    Medications  independent    Meal Preparation  independent    Housekeeping  independent    Laundry  independent    Shopping  independent    IADL Comments  Patient has coverage for medications with [employer] plan.       Mental Status    General Appearance WDL  WDL       Mental Status Summary    Recent Changes in Mental Status/Cognitive Functioning  ability to speak clearly;ability to understand       Employment/    Employment Status  retired        Psychosocial    No documentation.       Abuse/Neglect    No documentation.       Legal    No documentation.       Substance Abuse    No documentation.       Patient Forms    No documentation.           Margie Huff RN

## 2020-11-11 NOTE — NURSING NOTE
Encouraged patient to ambulate at least in the room to the chair. Patient able to sit at side of bed, but refused to go any further, stating pain is too intense. Assisted back to bed, and medicated for pain. Will try again later

## 2020-11-11 NOTE — ANESTHESIA PROCEDURE NOTES
Airway  Urgency: elective    Date/Time: 11/10/2020 7:51 PM  Airway not difficult    General Information and Staff    Patient location during procedure: OR    Indications and Patient Condition  Indications for airway management: airway protection    Preoxygenated: yes  MILS not maintained throughout  Mask difficulty assessment: 1 - vent by mask    Final Airway Details  Final airway type: endotracheal airway      Successful airway: ETT  Cuffed: yes   Successful intubation technique: direct laryngoscopy  Endotracheal tube insertion site: oral  Blade: Jair  Blade size: 3  ETT size (mm): 7.5  Cormack-Lehane Classification: grade IIa - partial view of glottis  Placement verified by: chest auscultation and capnometry   Measured from: lips  ETT/EBT  to lips (cm): 20  Number of attempts at approach: 1  Assessment: lips, teeth, and gum same as pre-op and atraumatic intubation    Additional Comments  Negative epigastric sounds, Breath sound equal bilaterally with symmetric chest rise and fall

## 2020-11-11 NOTE — ANESTHESIA PREPROCEDURE EVALUATION
Anesthesia Evaluation     Patient summary reviewed and Nursing notes reviewed   no history of anesthetic complications:  NPO Solid Status: > 8 hours  NPO Liquid Status: > 2 hours           Airway   Mallampati: II  TM distance: >3 FB  Neck ROM: full  No difficulty expected  Dental - normal exam     Pulmonary - negative pulmonary ROS and normal exam    breath sounds clear to auscultation  Cardiovascular - normal exam    ECG reviewed  Rhythm: regular  Rate: normal    (+) hypertension,       Neuro/Psych- negative ROS  GI/Hepatic/Renal/Endo      ROS Comment: Acute appendicitis without leukocytosis or fever    Musculoskeletal (-) negative ROS    Abdominal    Substance History      OB/GYN          Other - negative ROS                       Anesthesia Plan    ASA 2 - emergent     general   Rapid sequence  intravenous induction     Anesthetic plan, all risks, benefits, and alternatives have been provided, discussed and informed consent has been obtained with: patient.

## 2020-11-11 NOTE — OP NOTE
Operative Report    Patient Name:  Maximo Ordaz  YOB: 1950  [id number]    11/10/2020      PREOPERATIVE DIAGNOSIS: Acute appendicitis       POSTOPERATIVE DIAGNOSIS: Perforated appendicitis with feculent contamination of the right lower quadrant       PROCEDURE PERFORMED: Laparoscopic Appendectomy        SURGEON: Yvan Zayas MD      ASSISTANT:    None       SPECIMENS: Appendix and contents        ANESTHESIA: General.        FINDINGS:  1. An acutely inflamed appendix with evidence of perforation close to the base was identified with feculent contamination of the right lower quadrant  2.  The appendiceal base was able to be isolated and ligated using a single firing of the Ethicon blue load stapler, appendiceal base was oversewn using interrupted 3-0 silk sutures in a Lembert fashion  3.  A 10 Spanish flat NANO drain was left in the right lower quadrant       INDICATIONS:      This patient is a 70 y.o. male with a history of abdominal pain, concerning for acute appendicitis . Pre-operative imaging including CT scan confirmed the diagnosis. The risks, benefits, and alternatives of the procedure were discussed with the patient and their family preoperatively and they agreed to proceed.          DESCRIPTION OF PROCEDURE:      After obtaining informed consent, the patient was taken to the operating room and placed in the supine position on the operating room table. General anesthesia was initiated. A pratt catheter was placed. The abdomen was prepped and draped in the usual sterile fashion. A time-out was properly performed. Antibiotics were given preoperatively. SCDs were properly placed on the patient and turned on.     Local anesthetic was injected prior to all skin incisions. A periumbilical skin incision was then created superior to the umbilicus utilizing an 11 blade scalpel. Blunt dissection was carried down to the level of the anterior abdominal wall fascia and the base of the umbilicus. The  base of the umbilicus was then grasped and elevated with a Kocher clamp. A vertical incision was then made in the anterior abdominal wall fascia under direct visualization using cautery. Following this, the peritoneal cavity was then entered under direct visualization. Stay sutures of 0 Vicryl were placed around the defect, and a 12 mm Perry trocar was then advanced without difficulty into the abdominal cavity. Pneumoperitoneum was established at 15 mmHg. The abdomen was then surveyed with a 5mm 30 degree laparoscope, with special attention to the viscera underlying the insertion site which was found to be free of injury.  Next, two additional 5 mm trocars were placed, 1 midline suprapubic and the other in the left lower quadrant. The patient was then positioned in the head-down table tilt left position.      The right lower quadrant was inspected. Using bowel graspers, the omentum and small bowel were retracted superiorly away from the operative field. There was an acutely inflamed appendix identified.  This appendix was in a somewhat retrocecal location.  As we bluntly dissected free the appendix from the surrounding structures we encountered an abscess cavity close to the base.  There was eve purulence and feculent material present at this site.  Meticulous blunt dissection was used to free the appendix from the surrounding structures.  The noted area of perforation was close to the base of the appendix.  The lateral attachments of the cecum were freed from the pelvic sidewall so that the cecum could be somewhat elevated.  We identified just proximal to the perforated portion of the appendix a very short appendiceal stump.Using meticulous blunt dissection the base of the appendix was circumferentially cleared. A window was created in the mesoappendix at the base of the cecum using a [place] dissector. The appendix was then divided flush with the cecum using a single firing of a Ethicon blue load stapler, with  care taken not to encroach upon the ileocecal valve. An additional firing of the stapler was then used to divide the appendiceal mesentery using a white staple load.  The edges of the divided appendiceal base were somewhat friable and indurated but it appeared that the staple line was intact.  The staple line was then oversewn using interrupted 3-0 silk suture in a Lembert fashion.  With this completed the appendiceal base and staple line did not have evidence of leakage of succus or bleeding.  The appendix was then placed in an Endo Catch bag, and removed through the infraumbilical trocar site under direct visualization with a 5 mm 30 degree laparoscope.      The mesenteric and cecal staple lines were carefully inspected and there was no evidence of bleeding or leakage of succus. The right lower quadrant was irrigated with normal saline until clear and hemostasis again confirmed.  A 10 Citizen of the [place] flat NANO drain was then brought onto the field and inserted to the abdominal cavity.  This was brought out of the abdominal wall through the supraumbilical port site with the trocar removed.  The drain was placed within the right lower quadrant in the right paracolic gutter adjacent to the appendiceal base. Following this, the remaining 5 mm trocar was removed under direct laparoscopic visualization. The 12 mm trocar was then withdrawn and pneumoperitoneum was released. The fascia of the periumbilical trocar site was then closed in a figure-of-eight fashion with an 0 vicryl suture. All wound sites were irrigated and hemostasis confirmed. The skin incisions were then closed using a 4-0 monocryl suture in the subcuticular layer. Mastisol and steri strips were then applied to each wound site with a clean and dry dressing overlying this.      After the procedure, the patient was awakened, extubated, and taken to the postoperative anesthesia care unit for recovery. All needle, instrument, and lap counts were correct. I was  personally present and performed all portions of the procedure. There were no immediate complications         Yvan Zayas MD  11/10/2020  22:14 EST

## 2020-11-12 LAB
ANION GAP SERPL CALCULATED.3IONS-SCNC: 9 MMOL/L (ref 5–15)
BUN SERPL-MCNC: 17 MG/DL (ref 8–23)
BUN/CREAT SERPL: 19.5 (ref 7–25)
CALCIUM SPEC-SCNC: 8.7 MG/DL (ref 8.6–10.5)
CHLORIDE SERPL-SCNC: 102 MMOL/L (ref 98–107)
CO2 SERPL-SCNC: 25 MMOL/L (ref 22–29)
CREAT SERPL-MCNC: 0.87 MG/DL (ref 0.76–1.27)
CYTO UR: NORMAL
DEPRECATED RDW RBC AUTO: 42 FL (ref 37–54)
ERYTHROCYTE [DISTWIDTH] IN BLOOD BY AUTOMATED COUNT: 12 % (ref 12.3–15.4)
GFR SERPL CREATININE-BSD FRML MDRD: 87 ML/MIN/1.73
GLUCOSE SERPL-MCNC: 140 MG/DL (ref 65–99)
HCT VFR BLD AUTO: 46.5 % (ref 37.5–51)
HGB BLD-MCNC: 15 G/DL (ref 13–17.7)
LAB AP CASE REPORT: NORMAL
LAB AP CLINICAL INFORMATION: NORMAL
MAGNESIUM SERPL-MCNC: 2.1 MG/DL (ref 1.6–2.4)
MCH RBC QN AUTO: 30.7 PG (ref 26.6–33)
MCHC RBC AUTO-ENTMCNC: 32.3 G/DL (ref 31.5–35.7)
MCV RBC AUTO: 95.3 FL (ref 79–97)
PATH REPORT.FINAL DX SPEC: NORMAL
PATH REPORT.GROSS SPEC: NORMAL
PHOSPHATE SERPL-MCNC: 2.1 MG/DL (ref 2.5–4.5)
PLATELET # BLD AUTO: 195 10*3/MM3 (ref 140–450)
PMV BLD AUTO: 10.4 FL (ref 6–12)
POTASSIUM SERPL-SCNC: 3.8 MMOL/L (ref 3.5–5.2)
RBC # BLD AUTO: 4.88 10*6/MM3 (ref 4.14–5.8)
SODIUM SERPL-SCNC: 136 MMOL/L (ref 136–145)
WBC # BLD AUTO: 11.1 10*3/MM3 (ref 3.4–10.8)

## 2020-11-12 PROCEDURE — 25010000002 ONDANSETRON PER 1 MG: Performed by: SURGERY

## 2020-11-12 PROCEDURE — 25010000002 KETOROLAC TROMETHAMINE PER 15 MG: Performed by: SURGERY

## 2020-11-12 PROCEDURE — 63710000001 PROMETHAZINE PER 12.5 MG: Performed by: SURGERY

## 2020-11-12 PROCEDURE — 80048 BASIC METABOLIC PNL TOTAL CA: CPT | Performed by: SURGERY

## 2020-11-12 PROCEDURE — 25010000002 HEPARIN (PORCINE) PER 1000 UNITS: Performed by: SURGERY

## 2020-11-12 PROCEDURE — 83735 ASSAY OF MAGNESIUM: CPT | Performed by: SURGERY

## 2020-11-12 PROCEDURE — 84100 ASSAY OF PHOSPHORUS: CPT | Performed by: SURGERY

## 2020-11-12 PROCEDURE — 85027 COMPLETE CBC AUTOMATED: CPT | Performed by: SURGERY

## 2020-11-12 PROCEDURE — 25010000002 HYDROMORPHONE PER 4 MG: Performed by: SURGERY

## 2020-11-12 PROCEDURE — 25010000002 PIPERACILLIN SOD-TAZOBACTAM PER 1 G: Performed by: SURGERY

## 2020-11-12 RX ORDER — KETOROLAC TROMETHAMINE 30 MG/ML
15 INJECTION, SOLUTION INTRAMUSCULAR; INTRAVENOUS EVERY 6 HOURS
Status: COMPLETED | OUTPATIENT
Start: 2020-11-12 | End: 2020-11-14

## 2020-11-12 RX ORDER — AMLODIPINE BESYLATE 2.5 MG/1
2.5 TABLET ORAL
Status: DISCONTINUED | OUTPATIENT
Start: 2020-11-12 | End: 2020-11-16 | Stop reason: HOSPADM

## 2020-11-12 RX ORDER — ONDANSETRON 2 MG/ML
4 INJECTION INTRAMUSCULAR; INTRAVENOUS EVERY 6 HOURS PRN
Status: DISCONTINUED | OUTPATIENT
Start: 2020-11-12 | End: 2020-11-16 | Stop reason: HOSPADM

## 2020-11-12 RX ADMIN — TAZOBACTAM SODIUM AND PIPERACILLIN SODIUM 3.38 G: 375; 3 INJECTION, SOLUTION INTRAVENOUS at 05:14

## 2020-11-12 RX ADMIN — KETOROLAC TROMETHAMINE 15 MG: 30 INJECTION, SOLUTION INTRAMUSCULAR; INTRAVENOUS at 11:55

## 2020-11-12 RX ADMIN — ACETAMINOPHEN 650 MG: 325 TABLET, FILM COATED ORAL at 23:20

## 2020-11-12 RX ADMIN — ACETAMINOPHEN 650 MG: 325 TABLET, FILM COATED ORAL at 05:14

## 2020-11-12 RX ADMIN — SODIUM CHLORIDE, POTASSIUM CHLORIDE, SODIUM LACTATE AND CALCIUM CHLORIDE 100 ML/HR: 600; 310; 30; 20 INJECTION, SOLUTION INTRAVENOUS at 05:27

## 2020-11-12 RX ADMIN — HEPARIN SODIUM 5000 UNITS: 5000 INJECTION INTRAVENOUS; SUBCUTANEOUS at 14:06

## 2020-11-12 RX ADMIN — TAZOBACTAM SODIUM AND PIPERACILLIN SODIUM 3.38 G: 375; 3 INJECTION, SOLUTION INTRAVENOUS at 22:25

## 2020-11-12 RX ADMIN — KETOROLAC TROMETHAMINE 15 MG: 30 INJECTION, SOLUTION INTRAMUSCULAR; INTRAVENOUS at 05:13

## 2020-11-12 RX ADMIN — AMLODIPINE BESYLATE 2.5 MG: 2.5 TABLET ORAL at 11:55

## 2020-11-12 RX ADMIN — ONDANSETRON 4 MG: 2 INJECTION INTRAMUSCULAR; INTRAVENOUS at 17:23

## 2020-11-12 RX ADMIN — KETOROLAC TROMETHAMINE 15 MG: 30 INJECTION, SOLUTION INTRAMUSCULAR; INTRAVENOUS at 23:21

## 2020-11-12 RX ADMIN — TAZOBACTAM SODIUM AND PIPERACILLIN SODIUM 3.38 G: 375; 3 INJECTION, SOLUTION INTRAVENOUS at 14:06

## 2020-11-12 RX ADMIN — HYDROMORPHONE HYDROCHLORIDE 0.2 MG: 1 INJECTION, SOLUTION INTRAMUSCULAR; INTRAVENOUS; SUBCUTANEOUS at 23:35

## 2020-11-12 RX ADMIN — HEPARIN SODIUM 5000 UNITS: 5000 INJECTION INTRAVENOUS; SUBCUTANEOUS at 22:26

## 2020-11-12 RX ADMIN — HYDROMORPHONE HYDROCHLORIDE 0.2 MG: 1 INJECTION, SOLUTION INTRAMUSCULAR; INTRAVENOUS; SUBCUTANEOUS at 03:51

## 2020-11-12 RX ADMIN — OXYCODONE HYDROCHLORIDE 10 MG: 5 TABLET ORAL at 02:08

## 2020-11-12 RX ADMIN — KETOROLAC TROMETHAMINE 15 MG: 30 INJECTION, SOLUTION INTRAMUSCULAR; INTRAVENOUS at 17:19

## 2020-11-12 RX ADMIN — OXYCODONE HYDROCHLORIDE 10 MG: 5 TABLET ORAL at 22:25

## 2020-11-12 RX ADMIN — PROMETHAZINE HYDROCHLORIDE 12.5 MG: 12.5 TABLET ORAL at 20:28

## 2020-11-12 RX ADMIN — DOCUSATE SODIUM 100 MG: 100 CAPSULE ORAL at 20:28

## 2020-11-12 RX ADMIN — FAMOTIDINE 40 MG: 20 TABLET, FILM COATED ORAL at 09:47

## 2020-11-12 RX ADMIN — DOCUSATE SODIUM 100 MG: 100 CAPSULE ORAL at 09:47

## 2020-11-12 RX ADMIN — HEPARIN SODIUM 5000 UNITS: 5000 INJECTION INTRAVENOUS; SUBCUTANEOUS at 05:14

## 2020-11-12 RX ADMIN — ACETAMINOPHEN 650 MG: 325 TABLET, FILM COATED ORAL at 14:06

## 2020-11-12 NOTE — PROGRESS NOTES
"Patient Name:  Maximo Ordaz  YOB: 1950  [id number]    Surgery Progress Note    Date of visit: 11/12/2020      Subjective: No acute events overnight. Reports some continued pain in the mid abdomen which has been mostly controlled, has had more relief from toradol than other meds. Denies nausea or vomiting. Tolerating diet. Has ambulated. No flatus or BM yet. Denies fevers or chills           Objective:     /75   Pulse 89   Temp 98.8 °F (37.1 °C) (Oral)   Resp 19   Ht 193 cm (76\")   Wt 88.8 kg (195 lb 11.2 oz)   SpO2 91%   BMI 23.82 kg/m²     Intake/Output Summary (Last 24 hours) at 11/12/2020 0858  Last data filed at 11/12/2020 0527  Gross per 24 hour   Intake 2220 ml   Output 1710 ml   Net 510 ml       GEN:   Awake, alert, in no acute distress, resting comfortably in bed   CV:   Regular rate and rhythm  L:  Clear to auscultation bilaterally, not labored on room air   Abd:  Soft, tender to palpation in lower midline and RLQ, drain with serosang output   Ext:  No cyanosis, clubbing, or edema    Recent labs that are back at this time have been reviewed.           Assessment/ Plan:    Mr. Orosco is a 70-year-old gentleman with history of hypertension with perforated appendicitis status post laparoscopic appendectomy on November 10        #Perforated appendicitis with right lower quadrant fecal contamination  -Postoperative day 2  -Continue IV Zosyn.  Day 2/4 days total antibiotics  -Advance to FLD  -Out of bed and ambulate today  -Monitor NANO output. 110 mL output yesterday. Serosang   -Await return of bowel function  -Scheduled tylenol, PRN oxycodone and toradol for pain      #Hypertension  -Home meds as appropriate     FEN  -LR at 30 cc/h  -Full liquid diet     PPX  -Subcu heparin    Yvan Zayas MD  11/12/2020  08:44 EST    "

## 2020-11-12 NOTE — PLAN OF CARE
Goal Outcome Evaluation:  Plan of Care Reviewed With: patient  Progress: improving  Outcome Summary: VSS, pt in chair at beginnig of shift, UOP adequate, prn pain meds given, slept off and on, monitoring

## 2020-11-13 LAB
ANION GAP SERPL CALCULATED.3IONS-SCNC: 10 MMOL/L (ref 5–15)
BASOPHILS # BLD AUTO: 0.02 10*3/MM3 (ref 0–0.2)
BASOPHILS NFR BLD AUTO: 0.2 % (ref 0–1.5)
BUN SERPL-MCNC: 19 MG/DL (ref 8–23)
BUN/CREAT SERPL: 21.3 (ref 7–25)
CALCIUM SPEC-SCNC: 8.6 MG/DL (ref 8.6–10.5)
CHLORIDE SERPL-SCNC: 104 MMOL/L (ref 98–107)
CO2 SERPL-SCNC: 24 MMOL/L (ref 22–29)
CREAT SERPL-MCNC: 0.89 MG/DL (ref 0.76–1.27)
DEPRECATED RDW RBC AUTO: 41.8 FL (ref 37–54)
EOSINOPHIL # BLD AUTO: 0.14 10*3/MM3 (ref 0–0.4)
EOSINOPHIL NFR BLD AUTO: 1.5 % (ref 0.3–6.2)
ERYTHROCYTE [DISTWIDTH] IN BLOOD BY AUTOMATED COUNT: 12 % (ref 12.3–15.4)
GFR SERPL CREATININE-BSD FRML MDRD: 85 ML/MIN/1.73
GLUCOSE SERPL-MCNC: 122 MG/DL (ref 65–99)
HCT VFR BLD AUTO: 45.1 % (ref 37.5–51)
HGB BLD-MCNC: 14.6 G/DL (ref 13–17.7)
IMM GRANULOCYTES # BLD AUTO: 0.03 10*3/MM3 (ref 0–0.05)
IMM GRANULOCYTES NFR BLD AUTO: 0.3 % (ref 0–0.5)
LYMPHOCYTES # BLD AUTO: 1.03 10*3/MM3 (ref 0.7–3.1)
LYMPHOCYTES NFR BLD AUTO: 10.9 % (ref 19.6–45.3)
MAGNESIUM SERPL-MCNC: 2.5 MG/DL (ref 1.6–2.4)
MCH RBC QN AUTO: 30.5 PG (ref 26.6–33)
MCHC RBC AUTO-ENTMCNC: 32.4 G/DL (ref 31.5–35.7)
MCV RBC AUTO: 94.2 FL (ref 79–97)
MONOCYTES # BLD AUTO: 0.93 10*3/MM3 (ref 0.1–0.9)
MONOCYTES NFR BLD AUTO: 9.9 % (ref 5–12)
NEUTROPHILS NFR BLD AUTO: 7.26 10*3/MM3 (ref 1.7–7)
NEUTROPHILS NFR BLD AUTO: 77.2 % (ref 42.7–76)
NRBC BLD AUTO-RTO: 0 /100 WBC (ref 0–0.2)
PHOSPHATE SERPL-MCNC: 3.2 MG/DL (ref 2.5–4.5)
PLATELET # BLD AUTO: 206 10*3/MM3 (ref 140–450)
PMV BLD AUTO: 10.2 FL (ref 6–12)
POTASSIUM SERPL-SCNC: 4.2 MMOL/L (ref 3.5–5.2)
RBC # BLD AUTO: 4.79 10*6/MM3 (ref 4.14–5.8)
SODIUM SERPL-SCNC: 138 MMOL/L (ref 136–145)
WBC # BLD AUTO: 9.41 10*3/MM3 (ref 3.4–10.8)

## 2020-11-13 PROCEDURE — 84100 ASSAY OF PHOSPHORUS: CPT | Performed by: SURGERY

## 2020-11-13 PROCEDURE — 25010000002 KETOROLAC TROMETHAMINE PER 15 MG: Performed by: SURGERY

## 2020-11-13 PROCEDURE — 25010000002 PIPERACILLIN SOD-TAZOBACTAM PER 1 G: Performed by: SURGERY

## 2020-11-13 PROCEDURE — 25010000002 HEPARIN (PORCINE) PER 1000 UNITS: Performed by: SURGERY

## 2020-11-13 PROCEDURE — 25010000002 HYDROMORPHONE PER 4 MG: Performed by: SURGERY

## 2020-11-13 PROCEDURE — 83735 ASSAY OF MAGNESIUM: CPT | Performed by: SURGERY

## 2020-11-13 PROCEDURE — 85025 COMPLETE CBC W/AUTO DIFF WBC: CPT | Performed by: SURGERY

## 2020-11-13 PROCEDURE — 80048 BASIC METABOLIC PNL TOTAL CA: CPT | Performed by: SURGERY

## 2020-11-13 RX ORDER — SIMETHICONE 80 MG
80 TABLET,CHEWABLE ORAL 4 TIMES DAILY PRN
Status: DISCONTINUED | OUTPATIENT
Start: 2020-11-13 | End: 2020-11-16 | Stop reason: HOSPADM

## 2020-11-13 RX ADMIN — KETOROLAC TROMETHAMINE 15 MG: 30 INJECTION, SOLUTION INTRAMUSCULAR; INTRAVENOUS at 11:05

## 2020-11-13 RX ADMIN — ACETAMINOPHEN 650 MG: 325 TABLET, FILM COATED ORAL at 11:05

## 2020-11-13 RX ADMIN — AMLODIPINE BESYLATE 2.5 MG: 2.5 TABLET ORAL at 08:46

## 2020-11-13 RX ADMIN — HEPARIN SODIUM 5000 UNITS: 5000 INJECTION INTRAVENOUS; SUBCUTANEOUS at 21:29

## 2020-11-13 RX ADMIN — SODIUM CHLORIDE, POTASSIUM CHLORIDE, SODIUM LACTATE AND CALCIUM CHLORIDE 50 ML/HR: 600; 310; 30; 20 INJECTION, SOLUTION INTRAVENOUS at 17:03

## 2020-11-13 RX ADMIN — FAMOTIDINE 40 MG: 20 TABLET, FILM COATED ORAL at 08:46

## 2020-11-13 RX ADMIN — OXYCODONE HYDROCHLORIDE 10 MG: 5 TABLET ORAL at 12:24

## 2020-11-13 RX ADMIN — ACETAMINOPHEN 650 MG: 325 TABLET, FILM COATED ORAL at 05:52

## 2020-11-13 RX ADMIN — HEPARIN SODIUM 5000 UNITS: 5000 INJECTION INTRAVENOUS; SUBCUTANEOUS at 05:52

## 2020-11-13 RX ADMIN — KETOROLAC TROMETHAMINE 15 MG: 30 INJECTION, SOLUTION INTRAMUSCULAR; INTRAVENOUS at 17:55

## 2020-11-13 RX ADMIN — TAZOBACTAM SODIUM AND PIPERACILLIN SODIUM 3.38 G: 375; 3 INJECTION, SOLUTION INTRAVENOUS at 05:52

## 2020-11-13 RX ADMIN — TAZOBACTAM SODIUM AND PIPERACILLIN SODIUM 3.38 G: 375; 3 INJECTION, SOLUTION INTRAVENOUS at 21:32

## 2020-11-13 RX ADMIN — OXYCODONE HYDROCHLORIDE 10 MG: 5 TABLET ORAL at 17:09

## 2020-11-13 RX ADMIN — KETOROLAC TROMETHAMINE 15 MG: 30 INJECTION, SOLUTION INTRAMUSCULAR; INTRAVENOUS at 05:51

## 2020-11-13 RX ADMIN — ACETAMINOPHEN 650 MG: 325 TABLET, FILM COATED ORAL at 17:55

## 2020-11-13 RX ADMIN — DOCUSATE SODIUM 100 MG: 100 CAPSULE ORAL at 08:46

## 2020-11-13 RX ADMIN — HYDROMORPHONE HYDROCHLORIDE 0.2 MG: 1 INJECTION, SOLUTION INTRAMUSCULAR; INTRAVENOUS; SUBCUTANEOUS at 17:55

## 2020-11-13 RX ADMIN — TAZOBACTAM SODIUM AND PIPERACILLIN SODIUM 3.38 G: 375; 3 INJECTION, SOLUTION INTRAVENOUS at 14:16

## 2020-11-13 RX ADMIN — DOCUSATE SODIUM 100 MG: 100 CAPSULE ORAL at 21:29

## 2020-11-13 RX ADMIN — HEPARIN SODIUM 5000 UNITS: 5000 INJECTION INTRAVENOUS; SUBCUTANEOUS at 14:16

## 2020-11-13 NOTE — PLAN OF CARE
Patient with c/o nausea at the start of shift; resolved with PO phenergan.    Patient also with significant c/o pain at the start of the shift.  Resolved after + flatus, PO pain med, toradol, and dilaudid.    Patient ambulated in the halls.    Voiding.  VSS.   Incisions CDI.  NANO with adequate output.   Demonstrated use of I/S

## 2020-11-13 NOTE — PROGRESS NOTES
Adult Nutrition  Assessment/PES    Patient Name:  Maximo Ordaz  YOB: 1950  MRN: [id number]  Admit Date:  11/10/2020    Assessment Date:  11/13/2020      Reason for Assessment     Row Name 11/13/20 1535          Reason for Assessment    Reason For Assessment  -- NPO/CL x 3d. 15 mins     Diagnosis  -- perforated appendicitis,s/p lap appendectomy; complete hx/dx list per MD notes this adm.           Anthropometrics     Row Name 11/13/20 1539          Anthropometrics    Weight  -- ht=76in, it=082no; BMI=23.8               Nutrition Prescription Ordered     Row Name 11/13/20 1540          Nutrition Prescription PO    Current PO Diet  Clear Liquid         Evaluation of Received Nutrient/Fluid Intake     Row Name 11/13/20 1540          PO Evaluation    Number of Days PO Intake Evaluated  Insufficient Data               Problem/Interventions:  Problem 1     Row Name 11/13/20 1540          Nutrition Diagnoses Problem 1    Problem 1  Inadequate Intake/Infusion     Etiology (related to)  MNT for Treatment/Condition     Signs/Symptoms (evidenced by)  Clear Liquid Diet NPO/CL diet x 3d               Intervention Goal     Row Name 11/13/20 1543          Intervention Goal    PO  Advance diet         Nutrition Intervention     Row Name 11/13/20 1543          Nutrition Intervention    RD/Tech Action  Follow Tx progress;Supplement provided         Nutrition Prescription     Row Name 11/13/20 1535          Nutrition Prescription PO    PO Prescription  Begin supplement     Supplement  Boost Breeze     Supplement Frequency  3 times a day     New PO Prescription Ordered?  Yes         Education/Evaluation     Row Name 11/13/20 1543          Monitor/Evaluation    Monitor  Per protocol           Electronically signed by:  Zoraida Crane MS,RD,LD  11/13/20 15:43 EST

## 2020-11-13 NOTE — PROGRESS NOTES
"Patient Name:  Maximo Ordaz  YOB: 1950  [id number]    Surgery Progress Note    Date of visit: 11/13/2020      Subjective: No acute events overnight.  Had difficulty with pain yesterday evening and overnight, somewhat improved this morning.  Reports pain in the mid abdomen.  Had some nausea yesterday evening, no emesis.  Nausea improved today.  One episode of flatus yesterday but no bowel movement since surgery.  Has ambulated.  Denies fevers or chills          Objective:     /92   Pulse 80   Temp 97.5 °F (36.4 °C)   Resp 22   Ht 193 cm (76\")   Wt 88.8 kg (195 lb 11.2 oz)   SpO2 96%   BMI 23.82 kg/m²     Intake/Output Summary (Last 24 hours) at 11/13/2020 0801  Last data filed at 11/13/2020 0300  Gross per 24 hour   Intake 236 ml   Output 1297 ml   Net -1061 ml       GEN:   Awake, alert, in no acute distress, resting comfortably in bed   CV:   Regular rate and rhythm  L:  Clear to auscultation bilaterally, not labored on room air   Abd:  Moderately distended, tender to palpation in mid abdomen and right lower quadrant, NANO serous  Ext:  No cyanosis, clubbing, or edema    Recent labs that are back at this time have been reviewed.           Assessment/ Plan:    Mr. rOosco is a 70-year-old gentleman with history of hypertension with perforated appendicitis status post laparoscopic appendectomy on November 10        #Perforated appendicitis with right lower quadrant fecal contamination  -Postoperative day 3  -Continue IV Zosyn.  Day 3/4 days total antibiotics  -Nausea and no bowel function yet. Diet back to clears  -Out of bed and ambulate today  -Monitor NANO output. 180 mL output yesterday. Serous  -WBC down at 9.4. Afebrile, Not tachycardic   -Await return of bowel function  -Scheduled tylenol, PRN oxycodone and toradol for pain      #Ileus  -Clears as tolerated  -IVF  -AWROBF    #Hypertension  -Home meds as appropriate     FEN  -LR at 50 cc/h  -Clear liquid diet     PPX  -Subcu " lorraine Zayas MD  11/13/2020  08:01 EST

## 2020-11-13 NOTE — PROGRESS NOTES
Continued Stay Note  [facility]     Patient Name: Maximo Ordaz  MRN: [id number]  Today's Date: 11/13/2020    Admit Date: 11/10/2020    Discharge Plan     Row Name 11/13/20 1524       Plan    Plan  home    Patient/Family in Agreement with Plan  yes    Plan Comments  SW met with pt at bedside.  Pt is unsure when he will be discharged home.  He still has his drain in place.  Pt denies having any discharge needs at this time.  JER Fagan @ [phone]    Final Discharge Disposition Code  01 - home or self-care        Discharge Codes    No documentation.             JER Collins

## 2020-11-14 LAB
ANION GAP SERPL CALCULATED.3IONS-SCNC: 8 MMOL/L (ref 5–15)
BUN SERPL-MCNC: 18 MG/DL (ref 8–23)
BUN/CREAT SERPL: 20.7 (ref 7–25)
CALCIUM SPEC-SCNC: 8.5 MG/DL (ref 8.6–10.5)
CHLORIDE SERPL-SCNC: 103 MMOL/L (ref 98–107)
CO2 SERPL-SCNC: 27 MMOL/L (ref 22–29)
CREAT SERPL-MCNC: 0.87 MG/DL (ref 0.76–1.27)
DEPRECATED RDW RBC AUTO: 42.2 FL (ref 37–54)
ERYTHROCYTE [DISTWIDTH] IN BLOOD BY AUTOMATED COUNT: 11.9 % (ref 12.3–15.4)
GFR SERPL CREATININE-BSD FRML MDRD: 87 ML/MIN/1.73
GLUCOSE BLDC GLUCOMTR-MCNC: 106 MG/DL (ref 70–130)
GLUCOSE SERPL-MCNC: 123 MG/DL (ref 65–99)
HCT VFR BLD AUTO: 45.3 % (ref 37.5–51)
HGB BLD-MCNC: 14.7 G/DL (ref 13–17.7)
MAGNESIUM SERPL-MCNC: 2.3 MG/DL (ref 1.6–2.4)
MCH RBC QN AUTO: 31.2 PG (ref 26.6–33)
MCHC RBC AUTO-ENTMCNC: 32.5 G/DL (ref 31.5–35.7)
MCV RBC AUTO: 96.2 FL (ref 79–97)
PHOSPHATE SERPL-MCNC: 2.8 MG/DL (ref 2.5–4.5)
PLATELET # BLD AUTO: 248 10*3/MM3 (ref 140–450)
PMV BLD AUTO: 10.5 FL (ref 6–12)
POTASSIUM SERPL-SCNC: 4 MMOL/L (ref 3.5–5.2)
QT INTERVAL: 390 MS
QTC INTERVAL: 399 MS
RBC # BLD AUTO: 4.71 10*6/MM3 (ref 4.14–5.8)
SODIUM SERPL-SCNC: 138 MMOL/L (ref 136–145)
WBC # BLD AUTO: 7.56 10*3/MM3 (ref 3.4–10.8)

## 2020-11-14 PROCEDURE — 84100 ASSAY OF PHOSPHORUS: CPT | Performed by: SURGERY

## 2020-11-14 PROCEDURE — 25010000002 PIPERACILLIN SOD-TAZOBACTAM PER 1 G: Performed by: SURGERY

## 2020-11-14 PROCEDURE — 80048 BASIC METABOLIC PNL TOTAL CA: CPT | Performed by: SURGERY

## 2020-11-14 PROCEDURE — 25010000002 ONDANSETRON PER 1 MG: Performed by: SURGERY

## 2020-11-14 PROCEDURE — 82962 GLUCOSE BLOOD TEST: CPT

## 2020-11-14 PROCEDURE — 25010000002 KETOROLAC TROMETHAMINE PER 15 MG: Performed by: SURGERY

## 2020-11-14 PROCEDURE — 83735 ASSAY OF MAGNESIUM: CPT | Performed by: SURGERY

## 2020-11-14 PROCEDURE — 85027 COMPLETE CBC AUTOMATED: CPT | Performed by: SURGERY

## 2020-11-14 PROCEDURE — 25010000002 HEPARIN (PORCINE) PER 1000 UNITS: Performed by: SURGERY

## 2020-11-14 RX ADMIN — TAZOBACTAM SODIUM AND PIPERACILLIN SODIUM 3.38 G: 375; 3 INJECTION, SOLUTION INTRAVENOUS at 05:50

## 2020-11-14 RX ADMIN — TAZOBACTAM SODIUM AND PIPERACILLIN SODIUM 3.38 G: 375; 3 INJECTION, SOLUTION INTRAVENOUS at 14:08

## 2020-11-14 RX ADMIN — ACETAMINOPHEN 650 MG: 325 TABLET, FILM COATED ORAL at 23:04

## 2020-11-14 RX ADMIN — TAZOBACTAM SODIUM AND PIPERACILLIN SODIUM 3.38 G: 375; 3 INJECTION, SOLUTION INTRAVENOUS at 23:05

## 2020-11-14 RX ADMIN — ACETAMINOPHEN 650 MG: 325 TABLET, FILM COATED ORAL at 00:43

## 2020-11-14 RX ADMIN — KETOROLAC TROMETHAMINE 15 MG: 30 INJECTION, SOLUTION INTRAMUSCULAR; INTRAVENOUS at 11:55

## 2020-11-14 RX ADMIN — AMLODIPINE BESYLATE 2.5 MG: 2.5 TABLET ORAL at 08:26

## 2020-11-14 RX ADMIN — FAMOTIDINE 40 MG: 20 TABLET, FILM COATED ORAL at 08:27

## 2020-11-14 RX ADMIN — ONDANSETRON 4 MG: 2 INJECTION INTRAMUSCULAR; INTRAVENOUS at 17:18

## 2020-11-14 RX ADMIN — DOCUSATE SODIUM 100 MG: 100 CAPSULE ORAL at 08:26

## 2020-11-14 RX ADMIN — HEPARIN SODIUM 5000 UNITS: 5000 INJECTION INTRAVENOUS; SUBCUTANEOUS at 14:08

## 2020-11-14 RX ADMIN — ACETAMINOPHEN 650 MG: 325 TABLET, FILM COATED ORAL at 05:49

## 2020-11-14 RX ADMIN — HEPARIN SODIUM 5000 UNITS: 5000 INJECTION INTRAVENOUS; SUBCUTANEOUS at 23:05

## 2020-11-14 RX ADMIN — KETOROLAC TROMETHAMINE 15 MG: 30 INJECTION, SOLUTION INTRAMUSCULAR; INTRAVENOUS at 05:51

## 2020-11-14 RX ADMIN — KETOROLAC TROMETHAMINE 15 MG: 30 INJECTION, SOLUTION INTRAMUSCULAR; INTRAVENOUS at 01:31

## 2020-11-14 RX ADMIN — ACETAMINOPHEN 650 MG: 325 TABLET, FILM COATED ORAL at 17:18

## 2020-11-14 RX ADMIN — HEPARIN SODIUM 5000 UNITS: 5000 INJECTION INTRAVENOUS; SUBCUTANEOUS at 05:51

## 2020-11-14 RX ADMIN — ACETAMINOPHEN 650 MG: 325 TABLET, FILM COATED ORAL at 11:55

## 2020-11-14 RX ADMIN — DOCUSATE SODIUM 100 MG: 100 CAPSULE ORAL at 20:18

## 2020-11-14 NOTE — PLAN OF CARE
Minimal pain.  Ambulating in [place].  Demonstrates use of I/S  + flatus, BM 11/13.   Incisions CDI.   VSS.  Clear diet- tolerated popsicle this AM.

## 2020-11-14 NOTE — PLAN OF CARE
Problem: Adult Inpatient Plan of Care  Goal: Plan of Care Review  Outcome: Ongoing, Progressing  Flowsheets (Taken 11/14/2020 1510)  Progress: improving  Plan of Care Reviewed With: patient  Outcome Summary: Patient's pain has improved since the past few days. No c/o n&v. Patient tolerated regular diet. Ambulated felton several times throughout the day. Will continue to monitor.  Goal: Patient-Specific Goal (Individualized)  Outcome: Ongoing, Progressing  Goal: Absence of Hospital-Acquired Illness or Injury  Outcome: Ongoing, Progressing  Intervention: Identify and Manage Fall Risk  Recent Flowsheet Documentation  Taken 11/14/2020 1400 by Ronda Long RN  Safety Promotion/Fall Prevention: safety round/check completed  Taken 11/14/2020 1200 by Ronda Long RN  Safety Promotion/Fall Prevention: safety round/check completed  Taken 11/14/2020 1000 by Ronda Long RN  Safety Promotion/Fall Prevention: safety round/check completed  Taken 11/14/2020 0800 by Ronda Long RN  Safety Promotion/Fall Prevention: safety round/check completed  Intervention: Prevent Skin Injury  Recent Flowsheet Documentation  Taken 11/14/2020 1400 by Ronad Long RN  Body Position: sitting up in bed  Taken 11/14/2020 1200 by Ronda Long RN  Body Position: (Sitting in chair) other (see comments)  Taken 11/14/2020 1000 by Ronda Long RN  Body Position: sitting up in bed  Taken 11/14/2020 0800 by Ronda Long RN  Body Position: supine  Goal: Optimal Comfort and Wellbeing  Outcome: Ongoing, Progressing  Intervention: Provide Person-Centered Care  Recent Flowsheet Documentation  Taken 11/14/2020 0800 by Ronda Long RN  Trust Relationship/Rapport: care explained  Goal: Readiness for Transition of Care  Outcome: Ongoing, Progressing   Goal Outcome Evaluation:  Plan of Care Reviewed With: patient  Progress: improving  Outcome Summary: Patient's pain has improved since the past few days. No c/o n&v. Patient tolerated regular diet. Ambulated  felton several times throughout the day. Will continue to monitor.

## 2020-11-14 NOTE — PROGRESS NOTES
"Patient Name:  Maximo Ordaz  YOB: 1950  [id number]    Surgery Progress Note    Date of visit: 11/14/2020      Subjective: No acute events overnight.  Feeling somewhat better today.  Pain improved.  Had a bowel movement yesterday and passing flatus.  Denies nausea or vomiting.  Has tolerated clear liquid diet.  Denies fevers or chills.          Objective:     /73   Pulse 64   Temp 98.2 °F (36.8 °C) (Oral)   Resp 16   Ht 193 cm (76\")   Wt 88.8 kg (195 lb 11.2 oz)   SpO2 95%   BMI 23.82 kg/m²     Intake/Output Summary (Last 24 hours) at 11/14/2020 0959  Last data filed at 11/14/2020 0735  Gross per 24 hour   Intake 460 ml   Output 570 ml   Net -110 ml       GEN:   Awake, alert, in no acute distress, resting comfortably in bed   CV:   Regular rate and rhythm  L:  Clear to auscultation bilaterally, not labored on room air   Abd:  Soft, tender to palpation in lower midline and right lower quadrant, NANO serous, incisions clean and dry  Ext:  No cyanosis, clubbing, or edema    Recent labs that are back at this time have been reviewed.           Assessment/ Plan:    Mr. Orosco is a 70-year-old gentleman with history of hypertension with perforated appendicitis status post laparoscopic appendectomy on November 10        #Perforated appendicitis with right lower quadrant fecal contamination  -Postoperative day 4  -Continue IV Zosyn.  Day 4/4 days total antibiotics  -Bowel movement yesterday and no complaints of nausea this morning  -Out of bed and ambulate today  -Monitor NANO output.  290 mL output yesterday. Serous  -Labs are pending at this time  -Advance to regular diet  -Hep-Lock IV  -Scheduled tylenol, PRN oxycodone and toradol for pain   -Possible home tomorrow if continues to improve     #Ileus  -Bowel movement yesterday and appears to be improved  -Advance to regular diet today     #Hypertension  -Home meds as appropriate     FEN  -Hep-Lock IV  -Regular diet     PPX  -Subcu " heparin    Yvan Zayas MD  11/14/2020  09:58 EST

## 2020-11-15 PROCEDURE — 25010000002 PIPERACILLIN SOD-TAZOBACTAM PER 1 G: Performed by: SURGERY

## 2020-11-15 PROCEDURE — 25010000002 HEPARIN (PORCINE) PER 1000 UNITS: Performed by: SURGERY

## 2020-11-15 RX ADMIN — ACETAMINOPHEN 650 MG: 325 TABLET, FILM COATED ORAL at 17:33

## 2020-11-15 RX ADMIN — HEPARIN SODIUM 5000 UNITS: 5000 INJECTION INTRAVENOUS; SUBCUTANEOUS at 07:01

## 2020-11-15 RX ADMIN — ACETAMINOPHEN 650 MG: 325 TABLET, FILM COATED ORAL at 11:34

## 2020-11-15 RX ADMIN — ACETAMINOPHEN 650 MG: 325 TABLET, FILM COATED ORAL at 07:01

## 2020-11-15 RX ADMIN — FAMOTIDINE 40 MG: 20 TABLET, FILM COATED ORAL at 09:24

## 2020-11-15 RX ADMIN — TAZOBACTAM SODIUM AND PIPERACILLIN SODIUM 3.38 G: 375; 3 INJECTION, SOLUTION INTRAVENOUS at 07:01

## 2020-11-15 RX ADMIN — AMLODIPINE BESYLATE 2.5 MG: 2.5 TABLET ORAL at 09:24

## 2020-11-15 NOTE — PLAN OF CARE
Goal Outcome Evaluation:  Plan of Care Reviewed With: patient  Progress: improving  Outcome Summary: VSS. Slept well. no c/o pain. anticipate d/c on monday. tolerating diet. will continue to monitor.

## 2020-11-15 NOTE — PROGRESS NOTES
"Patient Name:  Maximo Ordaz  YOB: 1950  [id number]    Surgery Progress Note    Date of visit: 11/15/2020      Subjective: No acute events overnight.  Bowel movement x4 yesterday.  Had some nausea yesterday and did not eat very much.  Ambulated to the hallway several times.  Reports pain is significantly improved          Objective:     /79 (BP Location: Right arm, Patient Position: Lying)   Pulse 59   Temp 98.2 °F (36.8 °C) (Oral)   Resp 18   Ht 193 cm (76\")   Wt 88.8 kg (195 lb 11.2 oz)   SpO2 94%   BMI 23.82 kg/m²     Intake/Output Summary (Last 24 hours) at 11/15/2020 1026  Last data filed at 11/15/2020 0900  Gross per 24 hour   Intake 1060 ml   Output 560 ml   Net 500 ml       GEN:   Awake, alert, in no acute distress, resting comfortably in bed   CV:   Regular rate and rhythm  L:  Clear to auscultation bilaterally, not labored on room air   Abd:  Soft, appropriately tender along incisions and tenderness significantly improved, not distended, NANO serous  Ext:  No cyanosis, clubbing, or edema    Recent labs that are back at this time have been reviewed.           Assessment/ Plan:    Mr. Orosco is a 70-year-old gentleman with history of hypertension with perforated appendicitis status post laparoscopic appendectomy on November 10        #Perforated appendicitis with right lower quadrant fecal contamination  -Postoperative day 5  -Discontinue Zosyn today as he has received 4 days total antibiotics postoperatively  -Several bowel movements.  Pain significantly improved.  -Has only tolerated small amounts of diet  -Out of bed and ambulate today  -Monitor NANO output.  190 mL output yesterday. Serous.  Anticipate removal prior to discharge tomorrow  -Hep-Lock IV  -Scheduled tylenol, PRN oxycodone for pain  -Discussed possible discharge today, however he is concerned that he is not been able to tolerate very much by mouth.  We will assess his p.o. intake today and plan for tentative " discharge tomorrow as long as he is improving     #Ileus  -Resolved     #Hypertension  -Home meds as appropriate     FEN  -Hep-Lock IV  -Regular diet     PPX  -Subcu heparin      Yvan Zayas MD  11/15/2020  10:26 EST

## 2020-11-15 NOTE — PLAN OF CARE
Problem: Adult Inpatient Plan of Care  Goal: Plan of Care Review  Outcome: Ongoing, Progressing  Flowsheets  Taken 11/15/2020 1658 by Ronda Long RN  Outcome Summary: Patient has had no c/o pain. Patient has ambulated hallway several times throughout the day. VSS. Tolerated diet. No c/o n&v. Will continue to monitor.  Taken 11/15/2020 0407 by Marlin Isabel RN  Plan of Care Reviewed With: patient  Goal: Patient-Specific Goal (Individualized)  Outcome: Ongoing, Progressing  Goal: Absence of Hospital-Acquired Illness or Injury  Outcome: Ongoing, Progressing  Intervention: Identify and Manage Fall Risk  Recent Flowsheet Documentation  Taken 11/15/2020 1600 by Ronda Long RN  Safety Promotion/Fall Prevention: safety round/check completed  Taken 11/15/2020 1200 by Ronda Long RN  Safety Promotion/Fall Prevention: safety round/check completed  Taken 11/15/2020 1000 by Ronda Long RN  Safety Promotion/Fall Prevention: safety round/check completed  Taken 11/15/2020 0800 by Ronda Long RN  Safety Promotion/Fall Prevention: safety round/check completed  Intervention: Prevent Skin Injury  Recent Flowsheet Documentation  Taken 11/15/2020 1600 by Ronda Long RN  Body Position: sitting up in bed  Taken 11/15/2020 1200 by Ronda Long RN  Body Position: sitting up in bed  Taken 11/15/2020 1000 by Ronda Long RN  Body Position: sitting up in bed  Taken 11/15/2020 0800 by Ronda Long RN  Body Position: sitting up in bed  Goal: Optimal Comfort and Wellbeing  Outcome: Ongoing, Progressing  Goal: Readiness for Transition of Care  Outcome: Ongoing, Progressing   Goal Outcome Evaluation:  Plan of Care Reviewed With: patient  Progress: improving  Outcome Summary: Patient has had no c/o pain. Patient has ambulated hallway several times throughout the day. VSS. Tolerated diet. No c/o n&v. Will continue to monitor.

## 2020-11-16 VITALS
TEMPERATURE: 98.3 F | DIASTOLIC BLOOD PRESSURE: 86 MMHG | HEIGHT: 76 IN | RESPIRATION RATE: 16 BRPM | OXYGEN SATURATION: 95 % | HEART RATE: 74 BPM | SYSTOLIC BLOOD PRESSURE: 148 MMHG | BODY MASS INDEX: 23.83 KG/M2 | WEIGHT: 195.7 LBS

## 2020-11-16 RX ORDER — OXYCODONE HYDROCHLORIDE AND ACETAMINOPHEN 5; 325 MG/1; MG/1
1 TABLET ORAL EVERY 4 HOURS PRN
Qty: 12 TABLET | Refills: 0 | OUTPATIENT
Start: 2020-11-16 | End: 2021-07-24

## 2020-11-16 RX ORDER — DOCUSATE SODIUM 100 MG/1
100 CAPSULE, LIQUID FILLED ORAL 2 TIMES DAILY
Qty: 30 CAPSULE | Refills: 1 | Status: SHIPPED | OUTPATIENT
Start: 2020-11-16 | End: 2020-12-16

## 2020-11-16 RX ADMIN — AMLODIPINE BESYLATE 2.5 MG: 2.5 TABLET ORAL at 09:32

## 2020-11-16 RX ADMIN — ACETAMINOPHEN 650 MG: 325 TABLET, FILM COATED ORAL at 01:02

## 2020-11-16 RX ADMIN — ACETAMINOPHEN 650 MG: 325 TABLET, FILM COATED ORAL at 07:06

## 2020-11-16 RX ADMIN — FAMOTIDINE 40 MG: 20 TABLET, FILM COATED ORAL at 09:32

## 2020-11-16 NOTE — NURSING NOTE
VSS with no c/o pain. Ambulating independently and having good UOP as well as BM. Pt has refused heparin this shift, stating he has been ambulating. Plan is to DC home today. Will CTM and provide care.

## 2020-11-16 NOTE — DISCHARGE SUMMARY
Discharge Summary    Patient Name:  Maximo Ordaz  YOB: 1950  [id number]      DATE OF ADMISSION: 11/10/2020    DATE OF DISCHARGE: 11/16/2020       FINAL DIAGNOSES:   Patient Active Problem List   Diagnosis   • Acute appendicitis with localized peritonitis, without perforation, abscess, or gangrene       CONSULTING SERVICES: None      PROCEDURES PERFORMED:   1.  Laparoscopic appendectomy    HISTORY: The patient is a very pleasant 70 y.o. male who presented to [facility] on the evening of November 10 with acute appendicitis.      HOSPITAL COURSE: Mr. Ordaz was taken to the operating room on the evening that he presented to the hospital where he underwent laparoscopic appendectomy.  Intraoperative findings demonstrated evidence of perforated appendicitis with fecal contamination of the right lower quadrant.  A drain was placed.  He tolerated the operation without difficulty and was transported to the floor in stable condition.  Over the following several days he progressed.  He had difficulty initially with pain however this improved with medication.  He experienced return of bowel function on postoperative day 3.  He initially had difficulty with nausea and had a clinical history that was consistent with an ileus, however this improved with several bowel movements prior to discharge and ability to tolerate a regular diet.  He was treated with 4 days of IV antibiotics and did not experience recurrent fevers, chills, and had a downtrending leukocytosis.  On the morning of November 16 he was deemed appropriate for discharge in stable condition his drain was removed     CONDITION: At the time of discharge, the patient is tolerating a regular diet without difficulty. he is having normal bowel and bladder function. his incisions are clean, dry and intact.      DISCHARGE MEDICATIONS:   1. All previous home medications.   2. Percocet 5 - 10 mg PO  Q4h  PRN pain  3. Colace 100mg PO BID      DISCHARGE INSTRUCTIONS:  1. The patient is instructed to follow up with Dr. Zayas in 2 weeks’ time.  2. he is instructed to refrain from lifting more than 15 or 20 pounds until their return to clinic.   3. If the patient has worsening problems with fever or chills nausea or vomiting he is to contact Dr. Zayas's office and a contact card has been provided.        Yvan Zayas MD  11/16/2020  07:06 EST

## 2020-11-16 NOTE — DISCHARGE INSTRUCTIONS
Regular diet and activity as tolerated.  Please remind patient no lifting greater than 10 lbs for 2 weeks.   Rx provided for Percocet. Please instruct patient to transition to over the counter Tylenol and Ibuprofen as able.   Please remind patient to continue with daily stool softener. Rx provided for Docusate BID.   Ok to shower in 24 hrs. Let warm soapy water run over wounds. Pat dry do not scrub. Do not soak in tub bathe for 2 weeks.   Please instruction patient to call the [facility] [phone] if fever greater than 101.5, increased redness or drainage from wound sites, or worsening nausea or vomiting.

## 2021-01-29 ENCOUNTER — TELEPHONE (OUTPATIENT)
Dept: SURGERY | Facility: CLINIC | Age: 71
End: 2021-01-29

## 2021-08-10 ENCOUNTER — OFFICE VISIT (OUTPATIENT)
Dept: UROLOGY | Facility: CLINIC | Age: 71
End: 2021-08-10

## 2021-08-10 VITALS
RESPIRATION RATE: 20 BRPM | HEART RATE: 78 BPM | DIASTOLIC BLOOD PRESSURE: 70 MMHG | BODY MASS INDEX: 23.26 KG/M2 | HEIGHT: 76 IN | WEIGHT: 191 LBS | OXYGEN SATURATION: 97 % | SYSTOLIC BLOOD PRESSURE: 110 MMHG

## 2021-08-10 DIAGNOSIS — N40.2 PROSTATE NODULE: Primary | ICD-10-CM

## 2021-08-10 LAB
BILIRUB BLD-MCNC: NEGATIVE MG/DL
CLARITY, POC: CLEAR
COLOR UR: YELLOW
GLUCOSE UR STRIP-MCNC: NEGATIVE MG/DL
KETONES UR QL: NEGATIVE
LEUKOCYTE EST, POC: NEGATIVE
NITRITE UR-MCNC: NEGATIVE MG/ML
PH UR: 7.5 [PH] (ref 5–8)
PROT UR STRIP-MCNC: NEGATIVE MG/DL
RBC # UR STRIP: NEGATIVE /UL
SP GR UR: 1.01 (ref 1–1.03)
UROBILINOGEN UR QL: NORMAL

## 2021-08-10 PROCEDURE — 99213 OFFICE O/P EST LOW 20 MIN: CPT | Performed by: UROLOGY

## 2021-08-10 PROCEDURE — 81003 URINALYSIS AUTO W/O SCOPE: CPT | Performed by: UROLOGY

## 2021-08-10 RX ORDER — FLUTICASONE PROPIONATE 50 MCG
SPRAY, SUSPENSION (ML) NASAL
COMMUNITY
Start: 2021-07-27

## 2021-08-10 NOTE — PROGRESS NOTES
Chief Complaint  Follow-up        HPI  Orlin is a 71 y.o. male who returns today for annual checkup with a past history of prostate nodule.  More accurately he has a small nodule just outside the prostate and the gland itself is always been normal and associated with a PSA of 0.86 on the last visit.  He continues to deny any difficulty voiding describing an AUA index today of 0.    There were no vitals filed for this visit.    Past Medical History  Past Medical History:   Diagnosis Date   • Hypertension    • Kidney stones    • Prostate nodule        Past Surgical History  Past Surgical History:   Procedure Laterality Date   • APPENDECTOMY N/A 11/10/2020    Procedure: APPENDECTOMY LAPAROSCOPIC;  Surgeon: Yvan Zayas MD;  Location: On license of [facility];  Service: General;  Laterality: N/A;   • KNEE SURGERY Left      meniscus repair,  arthroscopy    • TONSILLECTOMY     • TOTAL HIP ARTHROPLASTY Left        Medications  has a current medication list which includes the following prescription(s): amlodipine, ascorbic acid, atorvastatin, fluticasone, lisinopril, magnesium, fish oil, pravastatin, and vitamin d.      Allergies  No Known Allergies    Social History  Social History     Socioeconomic History   • Marital status:      Spouse name: Not on file   • Number of children: Not on file   • Years of education: Not on file   • Highest education level: Not on file   Tobacco Use   • Smoking status: Former Smoker     Packs/day: 1.00     Years: 30.00     Pack years: 30.00     Types: Cigarettes     Start date: 1990     Quit date: 11/10/2020     Years since quittin.7   • Smokeless tobacco: Never Used   Vaping Use   • Vaping Use: Never used   Substance and Sexual Activity   • Alcohol use: Yes     Comment: occasionally   • Drug use: Defer   • Sexual activity: Defer       Family History  He has no family history of bladder or kidney cancer  He has no family history of kidney stones      AUA Symptom  Score:      Review of Systems  Review of Systems   Constitutional: Negative for activity change, appetite change, chills, fatigue, fever, unexpected weight gain and unexpected weight loss.   Respiratory: Negative for apnea, cough, chest tightness, shortness of breath, wheezing and stridor.    Cardiovascular: Negative for chest pain, palpitations and leg swelling.   Gastrointestinal: Negative for abdominal distention, abdominal pain, anal bleeding, blood in stool, constipation, diarrhea, nausea, rectal pain, vomiting, GERD and indigestion.   Genitourinary: Negative for decreased libido, decreased urine volume, difficulty urinating, discharge, dysuria, flank pain, frequency, genital sores, hematuria, nocturia, penile pain, erectile dysfunction, penile swelling, scrotal swelling, testicular pain, urgency and urinary incontinence.   Musculoskeletal: Negative for back pain and joint swelling.   Neurological: Negative for tremors, seizures, speech difficulty, weakness and numbness.   Psychiatric/Behavioral: Negative for agitation, decreased concentration, sleep disturbance, depressed mood and stress. The patient is not nervous/anxious.        Physical Exam  Physical Exam  Vitals reviewed.   Constitutional:       Appearance: He is well-developed.   HENT:      Head: Normocephalic and atraumatic.   Pulmonary:      Effort: Pulmonary effort is normal. No respiratory distress.   Abdominal:      General: There is no distension.      Palpations: Abdomen is soft. There is no mass.      Tenderness: There is no abdominal tenderness.      Hernia: No hernia is present.   Genitourinary:     Prostate: Normal.      Rectum: Normal.   Musculoskeletal:         General: Normal range of motion.      Cervical back: Normal range of motion.   Lymphadenopathy:      Cervical: No cervical adenopathy.   Skin:     General: Skin is warm and dry.   Neurological:      Mental Status: He is alert and oriented to person, place, and time.   Psychiatric:          Behavior: Behavior normal.         Labs Recent and today in the office:  Results for orders placed or performed during the hospital encounter of 07/24/21   COVID-19,[facility] ROUTINE, NP/OP SWAB IN TRANSPORT MEDIA OR ESWAB 72 HR TAT - Swab, Anterior nasal    Specimen: Anterior nasal; Swab   Result Value Ref Range    SARS-CoV-2, TANNA Not Detected Not Detected   SARS-CoV-2, TANNA 2 DAY TAT - Swab, Anterior nasal    Specimen: Anterior nasal; Swab   Result Value Ref Range    [facility] SARS-COV-2, TANNA 2 DAY TAT Performed    POCT Rapid Strep A    Specimen: Swab   Result Value Ref Range    Rapid Strep A Screen Negative Negative, VALID, INVALID, Not Performed    Internal Control Passed Passed    Lot Number [id number]     Expiration Date 3-          Assessment & Plan  Prostate nodule: He has a small lesion outside the gland on previous visits although today his digital rectal exam is perfectly benign.  A PSA is submitted and if normal he can return on an annual basis.  He is encouraged to eat a plant-based heart healthy diet to reduce the risk of getting prostate cancer and he appears to already be doing a pretty good job with this.

## 2021-08-11 LAB — PSA SERPL-MCNC: 1.08 NG/ML (ref 0–4)

## 2022-05-10 ENCOUNTER — TELEPHONE (OUTPATIENT)
Dept: SURGERY | Facility: CLINIC | Age: 72
End: 2022-05-10

## 2022-05-10 NOTE — TELEPHONE ENCOUNTER
Patient on 2020 recall for colonoscopy. Called patient he stated he thought that he wasn't due and ask me to fazal  PCP Dr Eladia Hawk. Called Dr Eladia Loredo office and was told that patient had colonoscopy 12/10/18 by Dr Maxime Shaver.Called patient again and told him that I would take him off our recall since he is under care of Dr Shaver.

## 2022-05-19 ENCOUNTER — TRANSCRIBE ORDERS (OUTPATIENT)
Dept: ADMINISTRATIVE | Facility: HOSPITAL | Age: 72
End: 2022-05-19

## 2022-05-19 DIAGNOSIS — M54.59 OTHER LOW BACK PAIN: Primary | ICD-10-CM

## 2022-06-28 ENCOUNTER — APPOINTMENT (OUTPATIENT)
Dept: MRI IMAGING | Facility: HOSPITAL | Age: 72
End: 2022-06-28

## (undated) DEVICE — THE ECHELON FLEX POWERED PLUS ARTICULATING ENDOSCOPIC LINEAR CUTTERS ARE STERILE, SINGLE PATIENT USE INSTRUMENTS THAT SIMULTANEOUSLYCUT AND STAPLE TISSUE. THERE ARE SIX STAGGERED ROWS OF STAPLES, THREE ON EITHER SIDE OF THE CUT LINE. THE ECHELON FLEX 45 POWERED PLUSINSTRUMENTS HAVE A STAPLE LINE THAT IS APPROXIMATELY 45 MM LONG AND A CUT LINE THAT IS APPROXIMATELY 42 MM LONG. THE SHAFT CAN ROTATE FREELYIN BOTH DIRECTIONS AND AN ARTICULATION MECHANISM ENABLES THE DISTAL PORTION OF THE SHAFT TO PIVOT TO FACILITATE LATERAL ACCESS TO THE OPERATIVESITE.THE INSTRUMENTS ARE PACKAGED WITH A PRIMARY LITHIUM BATTERY PACK THAT MUST BE INSTALLED PRIOR TO USE. THERE ARE SPECIFIC REQUIREMENTS FORDISPOSING OF THE BATTERY PACK. REFER TO THE BATTERY PACK DISPOSAL SECTION.THE INSTRUMENTS ARE PACKAGED WITHOUT A RELOAD AND MUST BE LOADED PRIOR TO USE. A STAPLE RETAINING CAP ON THE RELOAD PROTECTS THE STAPLE LEGPOINTS DURING SHIPPING AND TRANSPORTATION. THE INSTRUMENTS’ LOCK-OUT FEATURE IS DESIGNED TO PREVENT A USED OR IMPROPERLY INSTALLED RELOADFROM BEING REFIRED OR AN INSTRUMENT FROM BEING FIRED WITHOUT A RELOAD.: Brand: ECHELON FLEX

## (undated) DEVICE — SUT SILK 3/0 SH 30IN K832H

## (undated) DEVICE — SUT VIC 0 UR6 27IN VCP603H

## (undated) DEVICE — GOWN,PREVENTION PLUS,XXLARGE,STERILE: Brand: MEDLINE

## (undated) DEVICE — [HIGH FLOW INSUFFLATOR,  DO NOT USE IF PACKAGE IS DAMAGED,  KEEP DRY,  KEEP AWAY FROM SUNLIGHT,  PROTECT FROM HEAT AND RADIOACTIVE SOURCES.]: Brand: PNEUMOSURE

## (undated) DEVICE — ENDOPATH XCEL BLUNT TIP TROCARS WITH SMOOTH SLEEVES: Brand: ENDOPATH XCEL

## (undated) DEVICE — GLV SURG SENSICARE PI MIC PF SZ8 LF STRL

## (undated) DEVICE — ENDOPOUCH RETRIEVER SPECIMEN RETRIEVAL BAGS: Brand: ENDOPOUCH RETRIEVER

## (undated) DEVICE — VISUALIZATION SYSTEM: Brand: CLEARIFY

## (undated) DEVICE — ENDOCUT SCISSOR TIP, DISPOSABLE: Brand: RENEW

## (undated) DEVICE — PK LAP LASR CHOLE 10

## (undated) DEVICE — GLV SURG SENSICARE PI MIC PF SZ8.5 LF STRL

## (undated) DEVICE — ENDOPATH XCEL UNIVERSAL TROCAR STABLILITY SLEEVES: Brand: ENDOPATH XCEL

## (undated) DEVICE — ENDOPATH XCEL BLADELESS TROCARS WITH STABILITY SLEEVES: Brand: ENDOPATH XCEL

## (undated) DEVICE — CVR HNDL LIGHT RIGID

## (undated) DEVICE — SUT MNCRYL PLS ANTIB UD 4/0 PS2 18IN

## (undated) DEVICE — ECHELON FLEX POWERED PLUS LONG ARTICULATING ENDOSCOPIC LINEAR CUTTER, 60MM: Brand: ECHELON FLEX